# Patient Record
Sex: FEMALE | Race: WHITE | HISPANIC OR LATINO | Employment: UNEMPLOYED
[De-identification: names, ages, dates, MRNs, and addresses within clinical notes are randomized per-mention and may not be internally consistent; named-entity substitution may affect disease eponyms.]

---

## 2023-04-26 ENCOUNTER — HOSPITAL ENCOUNTER (OUTPATIENT)
Age: 43
End: 2023-04-26
Payer: MEDICAID

## 2023-04-27 ENCOUNTER — TRANSCRIBE ORDERS (OUTPATIENT)
Dept: MATERNAL FETAL MEDICINE | Facility: CLINIC | Age: 43
End: 2023-04-27

## 2023-04-27 DIAGNOSIS — O26.90 PREGNANCY RELATED CONDITION, ANTEPARTUM: Primary | ICD-10-CM

## 2023-04-28 ENCOUNTER — PRE VISIT (OUTPATIENT)
Dept: MATERNAL FETAL MEDICINE | Facility: CLINIC | Age: 43
End: 2023-04-28

## 2023-05-03 ENCOUNTER — HOSPITAL ENCOUNTER (OUTPATIENT)
Dept: ULTRASOUND IMAGING | Facility: CLINIC | Age: 43
Discharge: HOME | End: 2023-05-03
Attending: MIDWIFE

## 2023-05-03 ENCOUNTER — OFFICE VISIT (OUTPATIENT)
Dept: MATERNAL FETAL MEDICINE | Facility: CLINIC | Age: 43
End: 2023-05-03
Attending: MIDWIFE

## 2023-05-03 DIAGNOSIS — O26.90 PREGNANCY RELATED CONDITION, ANTEPARTUM: ICD-10-CM

## 2023-05-03 DIAGNOSIS — O09.523 MULTIGRAVIDA OF ADVANCED MATERNAL AGE IN THIRD TRIMESTER: Primary | ICD-10-CM

## 2023-05-03 PROCEDURE — 76811 OB US DETAILED SNGL FETUS: CPT | Mod: 26 | Performed by: OBSTETRICS & GYNECOLOGY

## 2023-05-03 PROCEDURE — 99203 OFFICE O/P NEW LOW 30 MIN: CPT | Mod: 25 | Performed by: OBSTETRICS & GYNECOLOGY

## 2023-05-03 PROCEDURE — 76811 OB US DETAILED SNGL FETUS: CPT

## 2023-05-03 NOTE — PROGRESS NOTES
[facility] Clinic Visit    Yolanda presents to [facility] clinic for ultrasound and recommendations. The following problems were addressed:    Advanced maternal age    Tests Reviewed:none  Tests Ordered: follow up ultrasound  Unique Records reviewed: na    Impression:  1) Farfan intrauterine pregnancy at 29w 1d gestational age.   2) None of the anomalies commonly detected by ultrasound were evident in the detailed fetal anatomic survey as described above.   3) Growth parameters and estimated fetal weight were consistent with established dates.  4) The amniotic fluid volume appeared normal.  5) Normal fetal activity for gestational age.  6) On transabdominal imaging the cervix appears long and closed.  7) Multiple fibroids are noted with the above measurements.    Plan:  Thank-you for referring your patient for a comprehensive ultrasound.  Yolanda states she remembers having genetic screening in [place] and states this was normal. I do into have these records.    I discussed the findings on today's ultrasound with the patient. A follow up ultrasound is scheduled at 36 weeks to reevaluate fetal growth. Weekly  testing will begin at that time.    Return to primary provider for continued prenatal care.    If you have questions regarding today's evaluation or if we can be of further service, please contact the M[facility] Medicine Center.    **Fetal anomalies may be present but not detected**    Amy Ny MD  Maternal Fetal Medicine    Total Time: 10 minutes spent on the date of the encounter doing chart review, history and exam, documentation and further activities as noted above.

## 2023-06-21 ENCOUNTER — HOSPITAL ENCOUNTER (OUTPATIENT)
Dept: ULTRASOUND IMAGING | Facility: CLINIC | Age: 43
Discharge: HOME | End: 2023-06-21
Attending: OBSTETRICS & GYNECOLOGY

## 2023-06-21 ENCOUNTER — OFFICE VISIT (OUTPATIENT)
Dept: MATERNAL FETAL MEDICINE | Facility: CLINIC | Age: 43
End: 2023-06-21
Attending: OBSTETRICS & GYNECOLOGY

## 2023-06-21 DIAGNOSIS — O09.523 MULTIGRAVIDA OF ADVANCED MATERNAL AGE IN THIRD TRIMESTER: Primary | ICD-10-CM

## 2023-06-21 DIAGNOSIS — O09.523 MULTIGRAVIDA OF ADVANCED MATERNAL AGE IN THIRD TRIMESTER: ICD-10-CM

## 2023-06-21 PROCEDURE — 76819 FETAL BIOPHYS PROFIL W/O NST: CPT | Mod: 26 | Performed by: OBSTETRICS & GYNECOLOGY

## 2023-06-21 PROCEDURE — 76816 OB US FOLLOW-UP PER FETUS: CPT | Mod: 26 | Performed by: OBSTETRICS & GYNECOLOGY

## 2023-06-21 PROCEDURE — 76816 OB US FOLLOW-UP PER FETUS: CPT

## 2023-06-21 NOTE — NURSING NOTE
Patient reports good fetal movement, denies pain, regular contractions, leaking of fluid, or bleeding.  Patient denies headache, visual changes, nausea/vomiting, epigastric pain related to preeclampsia.  Education provided to patient on Biophysical Profile US to be done weekly for AMA.  SBAR given to MFM MD, see their note in Epic.     used via IPAD during MFM appointment.

## 2023-06-21 NOTE — PATIENT INSTRUCTIONS
Patient Education   Biophysical Profile and Non-Stress Test  What are these tests for?  You may need these tests if your baby is at risk for certain problems. The tests will check your baby's health. They are often done in the third trimester.   You may have one or both tests every week until your baby is born. Some women have them twice a week.   What is a biophysical profile?  A biophysical profile is an ultrasound exam. The ultrasound will check your baby's:   Breathing movements  Body movements  Muscle tone  Amniotic fluid (the fluid around the baby).  All of this will tell us how healthy your baby is. This test takes about 30 minutes.  What is a non-stress test?  This test uses a fetal monitor to measure the baby's heart rate.   You will lie back in a chair or on a bed. We will place two belts around your abdomen. The belts are attached to a monitor.   This test will record:  the baby's heart rate  the baby's movements (the heart rate should increase when the baby moves)  if your uterus is angel (contractions, or tightenings, are common as you get closer to your due date).  A non-stress test takes about 30 minutes.   How do I get ready for these tests?  Don't smoke for at least 2 hours before these tests. In fact, you should quit smoking if you are pregnant.   When will I get my results?  You will have your results before you leave the clinic.   What if my baby doesn't pass these tests?  Most tests come out well. If your results are not reassuring, your care team will talk to you about your options. You may need to go to the hospital birthplace, where we can watch you more closely.  For informational purposes only. Not to replace the advice of your health care provider.   Copyright   2000, 2005 [employer] [facility]. Clinically reviewed by Maternal-Fetal Medicine Department.  All rights reserved. [employer] [id number] - REV 10/21.

## 2023-06-21 NOTE — PROGRESS NOTES
"Please see \"Imaging\" tab under \"Chart Review\" for details of today's US at the [facility].    Fabian Powell MD  Maternal-Fetal Medicine    "
negative...

## 2023-06-28 ENCOUNTER — HOSPITAL ENCOUNTER (OUTPATIENT)
Dept: ULTRASOUND IMAGING | Facility: CLINIC | Age: 43
Discharge: HOME | End: 2023-06-28
Attending: OBSTETRICS & GYNECOLOGY

## 2023-06-28 ENCOUNTER — OFFICE VISIT (OUTPATIENT)
Dept: MATERNAL FETAL MEDICINE | Facility: CLINIC | Age: 43
End: 2023-06-28
Attending: OBSTETRICS & GYNECOLOGY

## 2023-06-28 DIAGNOSIS — O09.523 MULTIGRAVIDA OF ADVANCED MATERNAL AGE IN THIRD TRIMESTER: Primary | ICD-10-CM

## 2023-06-28 DIAGNOSIS — O09.523 MULTIGRAVIDA OF ADVANCED MATERNAL AGE IN THIRD TRIMESTER: ICD-10-CM

## 2023-06-28 PROCEDURE — 76819 FETAL BIOPHYS PROFIL W/O NST: CPT | Mod: 26 | Performed by: OBSTETRICS & GYNECOLOGY

## 2023-06-28 PROCEDURE — 76819 FETAL BIOPHYS PROFIL W/O NST: CPT

## 2023-06-28 NOTE — NURSING NOTE
Yolanda Augusteernesto Damian is a  at 37w1d who presents to [facility] for a BPP.   on the I pad used for visit.  Pt reports positive fetal movement. Pt denies bldg/lof/change in discharge, contractions, headache, vision changes, chest pain/SOB or edema. SBAR given to Dr. Powell, see note in Epic.

## 2023-06-28 NOTE — PROGRESS NOTES
"Please see \"Imaging\" tab under \"Chart Review\" for details of today's US at the [facility].    Fabian Powell MD  Maternal-Fetal Medicine    "

## 2023-07-05 ENCOUNTER — HOSPITAL ENCOUNTER (OUTPATIENT)
Dept: ULTRASOUND IMAGING | Facility: CLINIC | Age: 43
Discharge: HOME | End: 2023-07-05
Attending: OBSTETRICS & GYNECOLOGY
Payer: MEDICAID

## 2023-07-05 ENCOUNTER — OFFICE VISIT (OUTPATIENT)
Dept: MATERNAL FETAL MEDICINE | Facility: CLINIC | Age: 43
End: 2023-07-05
Attending: OBSTETRICS & GYNECOLOGY
Payer: MEDICAID

## 2023-07-05 DIAGNOSIS — O09.523 MULTIGRAVIDA OF ADVANCED MATERNAL AGE IN THIRD TRIMESTER: Primary | ICD-10-CM

## 2023-07-05 DIAGNOSIS — O09.523 MULTIGRAVIDA OF ADVANCED MATERNAL AGE IN THIRD TRIMESTER: ICD-10-CM

## 2023-07-05 PROCEDURE — 76819 FETAL BIOPHYS PROFIL W/O NST: CPT

## 2023-07-05 PROCEDURE — 76819 FETAL BIOPHYS PROFIL W/O NST: CPT | Mod: 26 | Performed by: OBSTETRICS & GYNECOLOGY

## 2023-07-05 NOTE — PROGRESS NOTES
The patient was seen for an ultrasound in the [facility] Medicine Center at the [facility] today.  For a detailed report of the ultrasound examination, please see the ultrasound report which can be found under the imaging tab.    If you have questions regarding today's evaluation or if we can be of further service, please contact the M[facility]etal Medicine Center.    Joselin Zarate MD  , OB/GYN  Maternal-Fetal Medicine  [phone] (Pager)

## 2023-07-05 NOTE — NURSING NOTE
Patient reports active fetal movement, denies pain, regular contractions, leaking of fluid, or bleeding.  Patient denies headache, visual changes, nausea/vomiting, epigastric pain related to preeclampsia.  SBAR given to MFM MD, see their note in Epic.       used via IPAD during MFM appointment.

## 2023-07-11 ENCOUNTER — HOSPITAL ENCOUNTER (INPATIENT)
Facility: CLINIC | Age: 43
LOS: 2 days | Discharge: HOME | End: 2023-07-13
Attending: FAMILY MEDICINE | Admitting: ADVANCED PRACTICE MIDWIFE
Payer: MEDICAID

## 2023-07-11 ENCOUNTER — ANESTHESIA EVENT (OUTPATIENT)
Dept: OBGYN | Facility: CLINIC | Age: 43
End: 2023-07-11
Payer: MEDICAID

## 2023-07-11 ENCOUNTER — ANESTHESIA (OUTPATIENT)
Dept: OBGYN | Facility: CLINIC | Age: 43
End: 2023-07-11
Payer: MEDICAID

## 2023-07-11 PROBLEM — Z78.9 NOT IMMUNE TO HEPATITIS B VIRUS: Status: ACTIVE | Noted: 2023-07-11

## 2023-07-11 PROBLEM — O09.299 PRIOR PREGNANCY WITH FETAL DEMISE: Status: ACTIVE | Noted: 2023-07-11

## 2023-07-11 PROBLEM — O09.90 SUPERVISION OF HIGH RISK PREGNANCY, ANTEPARTUM: Status: ACTIVE | Noted: 2023-04-28

## 2023-07-11 PROBLEM — O09.523 MULTIGRAVIDA OF ADVANCED MATERNAL AGE IN THIRD TRIMESTER: Status: ACTIVE | Noted: 2023-05-30

## 2023-07-11 PROBLEM — O09.529 AMA (ADVANCED MATERNAL AGE) MULTIGRAVIDA 35+: Status: ACTIVE | Noted: 2023-07-11

## 2023-07-11 PROBLEM — Z34.90 ENCOUNTER FOR INDUCTION OF LABOR: Status: ACTIVE | Noted: 2023-07-11

## 2023-07-11 PROBLEM — O09.529 SUPERVISION OF HIGH-RISK PREGNANCY OF ELDERLY MULTIGRAVIDA: Status: ACTIVE | Noted: 2023-07-11

## 2023-07-11 LAB
ABO/RH(D): NORMAL
ANTIBODY SCREEN: NEGATIVE
HGB BLD-MCNC: 12.7 G/DL (ref 11.7–15.7)
SPECIMEN EXPIRATION DATE: NORMAL
T PALLIDUM AB SER QL: NONREACTIVE

## 2023-07-11 PROCEDURE — 86901 BLOOD TYPING SEROLOGIC RH(D): CPT

## 2023-07-11 PROCEDURE — 258N000003 HC RX IP 258 OP 636

## 2023-07-11 PROCEDURE — 86780 TREPONEMA PALLIDUM: CPT

## 2023-07-11 PROCEDURE — 3E0P7GC INTRODUCTION OF OTHER THERAPEUTIC SUBSTANCE INTO FEMALE REPRODUCTIVE, VIA NATURAL OR ARTIFICIAL OPENING: ICD-10-PCS | Performed by: FAMILY MEDICINE

## 2023-07-11 PROCEDURE — 85018 HEMOGLOBIN: CPT

## 2023-07-11 PROCEDURE — 00HU33Z INSERTION OF INFUSION DEVICE INTO SPINAL CANAL, PERCUTANEOUS APPROACH: ICD-10-PCS | Performed by: ANESTHESIOLOGY

## 2023-07-11 PROCEDURE — 250N000011 HC RX IP 250 OP 636

## 2023-07-11 PROCEDURE — 370N000003 HC ANESTHESIA WARD SERVICE: Performed by: ANESTHESIOLOGY

## 2023-07-11 PROCEDURE — 3E0R3BZ INTRODUCTION OF ANESTHETIC AGENT INTO SPINAL CANAL, PERCUTANEOUS APPROACH: ICD-10-PCS | Performed by: ANESTHESIOLOGY

## 2023-07-11 PROCEDURE — 99223 1ST HOSP IP/OBS HIGH 75: CPT | Mod: AI | Performed by: FAMILY MEDICINE

## 2023-07-11 PROCEDURE — 120N000002 HC R&B MED SURG/OB UMMC

## 2023-07-11 PROCEDURE — 250N000013 HC RX MED GY IP 250 OP 250 PS 637

## 2023-07-11 PROCEDURE — 250N000011 HC RX IP 250 OP 636: Performed by: ANESTHESIOLOGY

## 2023-07-11 PROCEDURE — 86850 RBC ANTIBODY SCREEN: CPT

## 2023-07-11 RX ORDER — CARBOPROST TROMETHAMINE 250 UG/ML
250 INJECTION, SOLUTION INTRAMUSCULAR
Status: DISCONTINUED | OUTPATIENT
Start: 2023-07-11 | End: 2023-07-12 | Stop reason: HOSPADM

## 2023-07-11 RX ORDER — MISOPROSTOL 200 UG/1
800 TABLET ORAL
Status: DISCONTINUED | OUTPATIENT
Start: 2023-07-11 | End: 2023-07-12 | Stop reason: HOSPADM

## 2023-07-11 RX ORDER — KETOROLAC TROMETHAMINE 30 MG/ML
30 INJECTION, SOLUTION INTRAMUSCULAR; INTRAVENOUS
Status: DISCONTINUED | OUTPATIENT
Start: 2023-07-11 | End: 2023-07-13 | Stop reason: HOSPADM

## 2023-07-11 RX ORDER — LIDOCAINE HYDROCHLORIDE AND EPINEPHRINE 15; 5 MG/ML; UG/ML
3 INJECTION, SOLUTION EPIDURAL
Status: DISCONTINUED | OUTPATIENT
Start: 2023-07-11 | End: 2023-07-12 | Stop reason: HOSPADM

## 2023-07-11 RX ORDER — OXYTOCIN/0.9 % SODIUM CHLORIDE 30/500 ML
100-340 PLASTIC BAG, INJECTION (ML) INTRAVENOUS CONTINUOUS PRN
Status: DISCONTINUED | OUTPATIENT
Start: 2023-07-11 | End: 2023-07-13 | Stop reason: HOSPADM

## 2023-07-11 RX ORDER — OXYTOCIN 10 [USP'U]/ML
10 INJECTION, SOLUTION INTRAMUSCULAR; INTRAVENOUS
Status: DISCONTINUED | OUTPATIENT
Start: 2023-07-11 | End: 2023-07-13 | Stop reason: HOSPADM

## 2023-07-11 RX ORDER — LIDOCAINE HYDROCHLORIDE 10 MG/ML
INJECTION, SOLUTION EPIDURAL; INFILTRATION; INTRACAUDAL; PERINEURAL
Status: DISCONTINUED
Start: 2023-07-11 | End: 2023-07-12 | Stop reason: WASHOUT

## 2023-07-11 RX ORDER — HYDROXYZINE HYDROCHLORIDE 50 MG/1
50 TABLET, FILM COATED ORAL
Status: DISCONTINUED | OUTPATIENT
Start: 2023-07-11 | End: 2023-07-12 | Stop reason: HOSPADM

## 2023-07-11 RX ORDER — MISOPROSTOL 200 UG/1
400 TABLET ORAL
Status: DISCONTINUED | OUTPATIENT
Start: 2023-07-11 | End: 2023-07-12 | Stop reason: HOSPADM

## 2023-07-11 RX ORDER — METOCLOPRAMIDE 10 MG/1
10 TABLET ORAL EVERY 6 HOURS PRN
Status: DISCONTINUED | OUTPATIENT
Start: 2023-07-11 | End: 2023-07-12 | Stop reason: HOSPADM

## 2023-07-11 RX ORDER — ONDANSETRON 4 MG/1
4 TABLET, ORALLY DISINTEGRATING ORAL EVERY 6 HOURS PRN
Status: DISCONTINUED | OUTPATIENT
Start: 2023-07-11 | End: 2023-07-12 | Stop reason: HOSPADM

## 2023-07-11 RX ORDER — NALOXONE HYDROCHLORIDE 0.4 MG/ML
0.2 INJECTION, SOLUTION INTRAMUSCULAR; INTRAVENOUS; SUBCUTANEOUS
Status: DISCONTINUED | OUTPATIENT
Start: 2023-07-11 | End: 2023-07-12 | Stop reason: HOSPADM

## 2023-07-11 RX ORDER — FENTANYL CITRATE 50 UG/ML
100 INJECTION, SOLUTION INTRAMUSCULAR; INTRAVENOUS
Status: DISCONTINUED | OUTPATIENT
Start: 2023-07-11 | End: 2023-07-12 | Stop reason: HOSPADM

## 2023-07-11 RX ORDER — OXYTOCIN 10 [USP'U]/ML
10 INJECTION, SOLUTION INTRAMUSCULAR; INTRAVENOUS
Status: DISCONTINUED | OUTPATIENT
Start: 2023-07-11 | End: 2023-07-12 | Stop reason: HOSPADM

## 2023-07-11 RX ORDER — PROCHLORPERAZINE MALEATE 10 MG
10 TABLET ORAL EVERY 6 HOURS PRN
Status: DISCONTINUED | OUTPATIENT
Start: 2023-07-11 | End: 2023-07-12 | Stop reason: HOSPADM

## 2023-07-11 RX ORDER — ONDANSETRON 2 MG/ML
4 INJECTION INTRAMUSCULAR; INTRAVENOUS EVERY 6 HOURS PRN
Status: DISCONTINUED | OUTPATIENT
Start: 2023-07-11 | End: 2023-07-12 | Stop reason: HOSPADM

## 2023-07-11 RX ORDER — PROCHLORPERAZINE 25 MG
25 SUPPOSITORY, RECTAL RECTAL EVERY 12 HOURS PRN
Status: DISCONTINUED | OUTPATIENT
Start: 2023-07-11 | End: 2023-07-12 | Stop reason: HOSPADM

## 2023-07-11 RX ORDER — SODIUM CHLORIDE, SODIUM LACTATE, POTASSIUM CHLORIDE, CALCIUM CHLORIDE 600; 310; 30; 20 MG/100ML; MG/100ML; MG/100ML; MG/100ML
INJECTION, SOLUTION INTRAVENOUS
Status: DISCONTINUED
Start: 2023-07-11 | End: 2023-07-12 | Stop reason: HOSPADM

## 2023-07-11 RX ORDER — MISOPROSTOL 100 UG/1
25 TABLET ORAL EVERY 4 HOURS PRN
Status: DISCONTINUED | OUTPATIENT
Start: 2023-07-11 | End: 2023-07-12 | Stop reason: HOSPADM

## 2023-07-11 RX ORDER — MISOPROSTOL 200 UG/1
TABLET ORAL
Status: DISCONTINUED
Start: 2023-07-11 | End: 2023-07-12 | Stop reason: WASHOUT

## 2023-07-11 RX ORDER — ACETAMINOPHEN 325 MG/1
650 TABLET ORAL EVERY 4 HOURS PRN
Status: DISCONTINUED | OUTPATIENT
Start: 2023-07-11 | End: 2023-07-12 | Stop reason: HOSPADM

## 2023-07-11 RX ORDER — OXYTOCIN 10 [USP'U]/ML
INJECTION, SOLUTION INTRAMUSCULAR; INTRAVENOUS
Status: DISCONTINUED
Start: 2023-07-11 | End: 2023-07-12 | Stop reason: WASHOUT

## 2023-07-11 RX ORDER — TRANEXAMIC ACID 10 MG/ML
1 INJECTION, SOLUTION INTRAVENOUS EVERY 30 MIN PRN
Status: DISCONTINUED | OUTPATIENT
Start: 2023-07-11 | End: 2023-07-12 | Stop reason: HOSPADM

## 2023-07-11 RX ORDER — NALBUPHINE HYDROCHLORIDE 10 MG/ML
2.5-5 INJECTION, SOLUTION INTRAMUSCULAR; INTRAVENOUS; SUBCUTANEOUS EVERY 6 HOURS PRN
Status: DISCONTINUED | OUTPATIENT
Start: 2023-07-11 | End: 2023-07-13 | Stop reason: HOSPADM

## 2023-07-11 RX ORDER — OXYTOCIN/0.9 % SODIUM CHLORIDE 30/500 ML
PLASTIC BAG, INJECTION (ML) INTRAVENOUS
Status: DISCONTINUED
Start: 2023-07-11 | End: 2023-07-12 | Stop reason: HOSPADM

## 2023-07-11 RX ORDER — METOCLOPRAMIDE HYDROCHLORIDE 5 MG/ML
10 INJECTION INTRAMUSCULAR; INTRAVENOUS EVERY 6 HOURS PRN
Status: DISCONTINUED | OUTPATIENT
Start: 2023-07-11 | End: 2023-07-12 | Stop reason: HOSPADM

## 2023-07-11 RX ORDER — LIDOCAINE 40 MG/G
CREAM TOPICAL
Status: DISCONTINUED | OUTPATIENT
Start: 2023-07-11 | End: 2023-07-12 | Stop reason: HOSPADM

## 2023-07-11 RX ORDER — CITRIC ACID/SODIUM CITRATE 334-500MG
30 SOLUTION, ORAL ORAL
Status: DISCONTINUED | OUTPATIENT
Start: 2023-07-11 | End: 2023-07-12 | Stop reason: HOSPADM

## 2023-07-11 RX ORDER — OXYTOCIN/0.9 % SODIUM CHLORIDE 30/500 ML
340 PLASTIC BAG, INJECTION (ML) INTRAVENOUS CONTINUOUS PRN
Status: DISCONTINUED | OUTPATIENT
Start: 2023-07-11 | End: 2023-07-12 | Stop reason: HOSPADM

## 2023-07-11 RX ORDER — FENTANYL CITRATE-0.9 % NACL/PF 10 MCG/ML
100 PLASTIC BAG, INJECTION (ML) INTRAVENOUS EVERY 5 MIN PRN
Status: DISCONTINUED | OUTPATIENT
Start: 2023-07-11 | End: 2023-07-12 | Stop reason: HOSPADM

## 2023-07-11 RX ORDER — METHYLERGONOVINE MALEATE 0.2 MG/ML
200 INJECTION INTRAVENOUS
Status: DISCONTINUED | OUTPATIENT
Start: 2023-07-11 | End: 2023-07-12 | Stop reason: HOSPADM

## 2023-07-11 RX ORDER — IBUPROFEN 800 MG/1
800 TABLET, FILM COATED ORAL
Status: DISCONTINUED | OUTPATIENT
Start: 2023-07-11 | End: 2023-07-13 | Stop reason: HOSPADM

## 2023-07-11 RX ORDER — FENTANYL/ROPIVACAINE/NS/PF 2MCG/ML-.1
PLASTIC BAG, INJECTION (ML) EPIDURAL
Status: DISCONTINUED | OUTPATIENT
Start: 2023-07-11 | End: 2023-07-12 | Stop reason: HOSPADM

## 2023-07-11 RX ORDER — OXYCODONE HYDROCHLORIDE 5 MG/1
5 TABLET ORAL
Status: DISCONTINUED | OUTPATIENT
Start: 2023-07-11 | End: 2023-07-13 | Stop reason: HOSPADM

## 2023-07-11 RX ORDER — NALOXONE HYDROCHLORIDE 0.4 MG/ML
0.4 INJECTION, SOLUTION INTRAMUSCULAR; INTRAVENOUS; SUBCUTANEOUS
Status: DISCONTINUED | OUTPATIENT
Start: 2023-07-11 | End: 2023-07-12 | Stop reason: HOSPADM

## 2023-07-11 RX ADMIN — MISOPROSTOL 25 MCG: 100 TABLET ORAL at 11:22

## 2023-07-11 RX ADMIN — MISOPROSTOL 25 MCG: 100 TABLET ORAL at 18:35

## 2023-07-11 RX ADMIN — Medication: at 23:12

## 2023-07-11 RX ADMIN — SODIUM CHLORIDE, POTASSIUM CHLORIDE, SODIUM LACTATE AND CALCIUM CHLORIDE 1000 ML: 600; 310; 30; 20 INJECTION, SOLUTION INTRAVENOUS at 14:00

## 2023-07-11 RX ADMIN — Medication 8 ML: at 22:56

## 2023-07-11 ASSESSMENT — ACTIVITIES OF DAILY LIVING (ADL)
ADLS_ACUITY_SCORE: 31
ADLS_ACUITY_SCORE: 35

## 2023-07-11 NOTE — PLAN OF CARE
"Yolanda was admitted for an IOL for AMA. She has a history of 5 losses (2 ectopics) and is now at 39weeks GA. She presented in no acute distress and is strictly SSO, her  can communicated simple directions and needs. Pt and  encourage to request interpretor at any time for clear communication, iPad left in the room and was used by the provider for initial assessment, teaching and planning with the patient and her . RN had used interpretor iPad for all assessments, teaching and discussions regarding the plan of care. Yolanda and her  appear happy and deny any questions or concerns. Induction with Miso initiated at 1120. Pt assisted with ordering lunch and  with quest tray. Pt noted not be drinking much PO fluids, instructed to increase PO intake and she drank 800cc right away. IV bolus infusing.       Problem: Plan of Care - These are the overarching goals to be used throughout the patient stay.    Goal: Plan of Care Review  Description: The Plan of Care Review/Shift note should be completed every shift.  The Outcome Evaluation is a brief statement about your assessment that the patient is improving, declining, or no change.  This information will be displayed automatically on your shift note.  Flowsheets (Taken 7/11/2023 1422)  Plan of Care Reviewed With:   patient   spouse  Overall Patient Progress: improving  Goal: Patient-Specific Goal (Individualized)  Description: You can add care plan individualizations to a care plan. Examples of Individualization might be:  \"Parent requests to be called daily at 9am for status\", \"I have a hard time hearing out of my right ear\", or \"Do not touch me to wake me up as it startles me\".  Flowsheets (Taken 7/11/2023 1422)  Individualized Care Needs:  required for communication, and medical discussions / teaching  Goal: Optimal Comfort and Wellbeing  Intervention: Provide Person-Centered Care  Recent Flowsheet Documentation  Taken " 7/11/2023 0958 by Leyla Tapia RN  Trust Relationship/Rapport:   care explained   questions answered   reassurance provided     Problem: Labor  Goal: Stable Fetal Wellbeing  Outcome: Progressing  Goal: Acceptable Pain Control  Outcome: Progressing  Goal: Normal Uterine Contraction Pattern  Outcome: Progressing     Problem: Pain Acute  Goal: Optimal Pain Control and Function  Outcome: Progressing     Problem: Maternal-Fetal Wellbeing  Goal: Optimal Maternal-Fetal Wellbeing  Outcome: Progressing   Goal Outcome Evaluation:      Plan of Care Reviewed With: patient, spouse    Overall Patient Progress: improvingOverall Patient Progress: improving

## 2023-07-11 NOTE — H&P
[facility]  OB Admit Note    Yolanda Damian MRN# [id number]   Age: 43 year old YOB: 1980     Date of Admission: 2023 10:43 AM  Date of service: 2023       History of Present Illness (Resident / Clinician):     Yolanda Damian is a patient from [facility]. Patient is a 43 year old  who is 39w0d pregnant with JAVIER  2023, by 8w US not c/w LMP    The patient presents to the BirthPlace for induction of labor, indication advanced maternal age, history of recurrent fetal demise.    Reports regular non-painful occurring every 20 minutes. denies fluid leakage. denies bleeding per vagina. Fetal movement is .normal.    The prenatal course has been complicated by   - AMA  - History of recurrent fetal demise  - Hepatitis B non-immune      Prenatal labs   Lab Results   Component Value Date    HGB 12.7 2023       GBS was collected on 23 and was negative    Weight gain during pregnancy: 12 lbs   2023: 74.1 kg (163 lb 6 oz)   2023: 79.4 kg (175 lb)    EFW at 36w was 2718g 6 lbs 37%ile         Obstetrical History:   She is a 43 year old   Her OB history:   OB History    Para Term  AB Living   6 0 0 0 5 0   SAB IAB Ectopic Multiple Live Births   3 0 2 0 0      # Outcome Date GA Lbr Mika/2nd Weight Sex Delivery Anes PTL Lv   6 Current            5 Ectopic 11/10/16     ECTOPIC         Birth Comments: treated with MTX, L side   4 SAB 08/06/15              Birth Comments: System Generated. Please review and update pregnancy details.   3 Ectopic 03/15/15           2 SAB 14           1 SAB 13                      Immunzations:       There is no immunization history on file for this patient.  Tdap this pregnancy?:YES - Date: 2023  Flu shot this pregnancy? No  COVID immunized?: Yes         Past Medical History:   History reviewed. No pertinent past medical history.         Past Surgical History:   History  "reviewed. No pertinent surgical history.         Family History:   History reviewed. No pertinent family history.  No family history of congenital anomalies         Social History:   no tobacco use  no alcohol use  no illicit drug use         Medications:   No current facility-administered medications on file prior to encounter.  No current outpatient medications on file prior to encounter.           Allergies:   Patient has no known allergies.         Review of Systems:   CONSTITUTIONAL: no fatigue, no unexpected change in weight  SKIN: no worrisome rashes or lesions  EYES: no acute vision problems or changes  ENT: no ear problems, no mouth problems, no throat problems  RESP: no significant cough, no shortness of breath  CV: no chest pain, no palpitations, no new or worsening peripheral edema  GI: no nausea, no vomiting, no constipation, no diarrhea  : no frequency, no dysuria, no hematuria  NEURO: no weakness, no dizziness, no headaches  ENDOCRINE: no temperature intolerance, no skin/hair changes  PSYCHIATRIC: NEGATIVE for changes in mood or trouble with sleep         Physical Exam:   Vitals:   Temp 98.5  F (36.9  C) (Oral)   Resp 18   Ht 1.575 m (5' 2\")   Wt 79.4 kg (175 lb)   LMP 10/19/2022   BMI 32.01 kg/m    175 lbs 0 oz  Estimated body mass index is 32.01 kg/m  as calculated from the following:    Height as of this encounter: 1.575 m (5' 2\").    Weight as of this encounter: 79.4 kg (175 lb).    GEN: Awake, alert in no apparent distress   HEENT: grossly normal  RESPIRATORY: no increased work of breathing  CARDIOVASCULAR: warm well perfused  ABDOMEN: gravid  PELVIC:  no fluid noted, no blood noted.    Cervix: 0/20%/-4, posterior, medium consistency by MD  Confirmed VTX by Ultrasound? YES     Electronic Fetal Monitoring:  O: Baseline rate normal  Variability moderate  Accelerations present  Decelerations not present      Assessment: Reactive NST    Strip reviewed at bedside    NST interpretation:  Ordered " by  Dr. Enedelia Serrano  Start time: 9:50am   Stop time 10:50am  Baseline rate 150 normal  Accelerations present  Decelerations not present  Interpretation: reactive      Assessment and Plan:   Assessment:   Yolanda Damian is a 43 year old  at 39w0d in prodromal labor here for IOL  AMA and history of recurrent fetal demise. Admission marmolejo is 1. Feeling contractions q20 min as mild tightening without pain, angel q5-6 min on the monitor. Membranes are intact. GBS status is negative. Blood type is O positive.     No family history of PPH. No personal history of asthma, hypertension, or bleeding disorder. No family history of congenital anomalies.     Pregnancy complicated by   - AMA  - History of recurrent fetal demise  - Hepatitis B non-immune    Patient Active Problem List   Diagnosis     Encounter for induction of labor     AMA (advanced maternal age) multigravida 35+     Prior pregnancy with fetal demise     Supervision of high-risk pregnancy of elderly multigravida     Not immune to hepatitis B virus         Plan:     # Induction of Labor   Cervix: 0/20%/-4 medium posterior, Marmolejo score: 1.  - Admit for IOL in the setting of AMA and history of recurrent pregnancy loss  - Will plan to start with vaginal misoprostol given above Marmolejo's score  - Open to balloon catheter   - Membranes: intact  - Augmentation: Pitocin, AROM prn  - Labs: Hgb, T&S, RPR ordered and pending  - GBS negative 23; Antibiotics not indicated.   - Pain Control: Per patient request; Discussed options. Considering epidural in active labor  - Lacks contraindications to routine induction medications  - Diet: Regular until epidural, then CLD  - PPH Meds: Standard Meds. Low risk. Lacks contraindications to routine PPH meds.  - PPx: SCDs for prolonged periods of immobility    # Hepatitis B non-immune  - Recommend Hep B vaccine for mom and baby in immediate post-partum period    # AMA  - IOL    # PNC  - Rh pos, Rubella  immune, GCT passed 1h, GBS negative  - Other prenatal labs wnl  - Imaging: Placenta posterior, no previa > 2 cm from internal os   - Flu vaccine not indicated, Tdap vaccine given 2023  - Pap last 23 normal  - Contraception: not discussed  - Feeding: not discussed                    # FWB:   Cat I tracing, vertex by BSUS; EFW by US at 36w was 2718g 6 lbs 37%ile  - Continuous fetal monitoring  - NICU for delivery not indicated unless intrapartum concerns arise  - Intrauterine resuscitative measures prn     # PPH Risk:  Medium (IOL, augment with pit, HTN, mag, triple III, prolonged labor, precipitous labor/delivery, h/o abruption, >5 deliveries, fibroids, large baby, poly, multiples, general anesthesia, shoulder dystocia, lacs/epis, op delivery, hematoma)- IV, type and screen      # Disposition and coordination:  - Admit - see IP orders  cervical ripening with misoprostol  Pain medication: fentanyl and nitrous oxide available per protocol, considering epidural in active labor  Theraputic sleep w/ hydroxyzine ordered PRN  Anticipate            MD Candice Salinas's   PGY2    Physician Attestation   I saw this patient with the resident and agree with the resident/fellow's findings and plan of care as documented in the note.  75 minutes spent on the date of the encounter doing chart review, history and exam, documentation and further activities per the note     Enedelia Serrano MD  Date of Service (when I saw the patient): 23

## 2023-07-11 NOTE — PROGRESS NOTES
"S;General appearance: comfortable  Support: spouse David here at her side       Temperature 98.5  F (36.9  C), temperature source Oral, resp. rate 18, height 1.575 m (5' 2\"), weight 79.4 kg (175 lb), last menstrual period 10/19/2022.      Baseline rate 150, normal  Variability moderate  Accelerations present   Decelerations not presentCONTRACTIONS:   Contractions every 2-5 minutes.  Palpate: mild  Pitocin- none,  Antibiotics- PCN  miso PV # 1 at 1130 per Dr Serrano     ROM: not ruptured  PELVIC EXAM:deferred  # Pain Assessment:   Yolanda mo pain level was assessed and she currently denies pain.        Assessment: EFM interpretation suggests absence of concern for fetal metabolic acidemia at this time  Labor course:    ASSESSMENT:  ==============  Yolanda MILLIE Damian  43 year old  female  Estimated Date of Delivery: 2023  42 yo  IUP @ 39w0d IOL for AMA, hx of recurrent fetal loss    Fetal Heart rate tracing  category one   GBS- negative    Patient Active Problem List   Diagnosis     Encounter for induction of labor     AMA (advanced maternal age) multigravida 35+     Prior pregnancy with fetal demise     Supervision of high-risk pregnancy of elderly multigravida     Not immune to hepatitis B virus          PLAN:  ===========  comfort measures prn   cervical ripening with misoprostol  Anticipate   MD consultant on call / available prn  reevaluate in 2-4 hours/PRN   JOAN BarrowM    "

## 2023-07-11 NOTE — PROGRESS NOTES
"S;General appearance: comfortable   via IPAD  Discussion of cervical ripening process and tools miso, medina balloon    pt is noting contractions getting stronger since 1530  Discussed deferred vaginal miso dose at 1530  due to contraction frequency  Denies bloody show or SROM   Support: spouse is at her side David   Pt consents to exam to evaluate for medina balloon mechanical ripening as unable to place miso at this time     Temperature 98.5  F (36.9  C), temperature source Oral, resp. rate 18, height 1.575 m (5' 2\"), weight 79.4 kg (175 lb), last menstrual period 10/19/2022.      Baseline rate 145, normal  Variability moderate  Periods of minimal variability no decels   Accelerations present   Decelerations not present    CONTRACTIONS: Contractions every 2-3 minutes with occ contraction 4 min apart   Palpate: mild  Pitocin- none,  Antibiotics- none  ROM: not ruptured  PELVIC EXAM: closed/ 50-60%/ Mid/ average/ -2   # Pain Assessment:   Yolanda mo pain level was assessed and she currently denies pain.        Assessment: EFM interpretation suggests absence of concern for fetal metabolic acidemia at this time due to accelerations present, heart rate: normal baseline and variability: moderate    ASSESSMENT:  ==============  Yolanda PINTO Katie Damian  43 year old  female  Estimated Date of Delivery: 2023  IUP @ 39w0d IOL AMA    Fetal Heart rate tracing  category one  GBS- negative    Patient Active Problem List   Diagnosis     Encounter for induction of labor     AMA (advanced maternal age) multigravida 35+     Prior pregnancy with fetal demise     Supervision of high-risk pregnancy of elderly multigravida     Not immune to hepatitis B virus          PLAN:  ===========  comfort measures prn   Discussed time off EFM at this time spt will get up and walk use ball move around room  To contact provider if noting contractions spacing further  Plan to reevaluate for next miso  with EFM in an " hour or prn    Discussed eating drinking moving   Consider reevaluation for medina balloon this evening   Couple is agreeable to plan   Anticipate   MD consultant on call / available prn  reevaluate in 2-4 hours/PRN     JOAN Barrow CNM

## 2023-07-12 ENCOUNTER — APPOINTMENT (OUTPATIENT)
Dept: INTERPRETER SERVICES | Facility: CLINIC | Age: 43
End: 2023-07-12
Payer: MEDICAID

## 2023-07-12 PROCEDURE — 59409 OBSTETRICAL CARE: CPT | Performed by: ADVANCED PRACTICE MIDWIFE

## 2023-07-12 PROCEDURE — 250N000009 HC RX 250

## 2023-07-12 PROCEDURE — 120N000002 HC R&B MED SURG/OB UMMC

## 2023-07-12 PROCEDURE — 722N000001 HC LABOR CARE VAGINAL DELIVERY SINGLE

## 2023-07-12 PROCEDURE — 250N000013 HC RX MED GY IP 250 OP 250 PS 637: Performed by: ADVANCED PRACTICE MIDWIFE

## 2023-07-12 RX ORDER — ACETAMINOPHEN 325 MG/1
650 TABLET ORAL EVERY 4 HOURS PRN
Status: DISCONTINUED | OUTPATIENT
Start: 2023-07-12 | End: 2023-07-13 | Stop reason: HOSPADM

## 2023-07-12 RX ORDER — OXYTOCIN 10 [USP'U]/ML
10 INJECTION, SOLUTION INTRAMUSCULAR; INTRAVENOUS
Status: DISCONTINUED | OUTPATIENT
Start: 2023-07-12 | End: 2023-07-13 | Stop reason: HOSPADM

## 2023-07-12 RX ORDER — NALOXONE HYDROCHLORIDE 0.4 MG/ML
0.2 INJECTION, SOLUTION INTRAMUSCULAR; INTRAVENOUS; SUBCUTANEOUS
Status: DISCONTINUED | OUTPATIENT
Start: 2023-07-12 | End: 2023-07-13 | Stop reason: HOSPADM

## 2023-07-12 RX ORDER — MISOPROSTOL 200 UG/1
800 TABLET ORAL
Status: DISCONTINUED | OUTPATIENT
Start: 2023-07-12 | End: 2023-07-13 | Stop reason: HOSPADM

## 2023-07-12 RX ORDER — NALOXONE HYDROCHLORIDE 0.4 MG/ML
0.4 INJECTION, SOLUTION INTRAMUSCULAR; INTRAVENOUS; SUBCUTANEOUS
Status: DISCONTINUED | OUTPATIENT
Start: 2023-07-12 | End: 2023-07-13 | Stop reason: HOSPADM

## 2023-07-12 RX ORDER — MODIFIED LANOLIN
OINTMENT (GRAM) TOPICAL
Status: DISCONTINUED | OUTPATIENT
Start: 2023-07-12 | End: 2023-07-13 | Stop reason: HOSPADM

## 2023-07-12 RX ORDER — OXYTOCIN/0.9 % SODIUM CHLORIDE 30/500 ML
340 PLASTIC BAG, INJECTION (ML) INTRAVENOUS CONTINUOUS PRN
Status: DISCONTINUED | OUTPATIENT
Start: 2023-07-12 | End: 2023-07-13 | Stop reason: HOSPADM

## 2023-07-12 RX ORDER — MISOPROSTOL 200 UG/1
400 TABLET ORAL
Status: DISCONTINUED | OUTPATIENT
Start: 2023-07-12 | End: 2023-07-13 | Stop reason: HOSPADM

## 2023-07-12 RX ORDER — DOCUSATE SODIUM 100 MG/1
100 CAPSULE, LIQUID FILLED ORAL DAILY
Status: DISCONTINUED | OUTPATIENT
Start: 2023-07-12 | End: 2023-07-13 | Stop reason: HOSPADM

## 2023-07-12 RX ORDER — BISACODYL 10 MG
10 SUPPOSITORY, RECTAL RECTAL DAILY PRN
Status: DISCONTINUED | OUTPATIENT
Start: 2023-07-12 | End: 2023-07-13 | Stop reason: HOSPADM

## 2023-07-12 RX ORDER — IBUPROFEN 800 MG/1
800 TABLET, FILM COATED ORAL EVERY 6 HOURS PRN
Status: DISCONTINUED | OUTPATIENT
Start: 2023-07-12 | End: 2023-07-13 | Stop reason: HOSPADM

## 2023-07-12 RX ORDER — METHYLERGONOVINE MALEATE 0.2 MG/ML
200 INJECTION INTRAVENOUS
Status: DISCONTINUED | OUTPATIENT
Start: 2023-07-12 | End: 2023-07-13 | Stop reason: HOSPADM

## 2023-07-12 RX ORDER — HYDROCORTISONE 25 MG/G
CREAM TOPICAL 3 TIMES DAILY PRN
Status: DISCONTINUED | OUTPATIENT
Start: 2023-07-12 | End: 2023-07-13 | Stop reason: HOSPADM

## 2023-07-12 RX ORDER — CARBOPROST TROMETHAMINE 250 UG/ML
250 INJECTION, SOLUTION INTRAMUSCULAR
Status: DISCONTINUED | OUTPATIENT
Start: 2023-07-12 | End: 2023-07-13 | Stop reason: HOSPADM

## 2023-07-12 RX ORDER — TRANEXAMIC ACID 10 MG/ML
1 INJECTION, SOLUTION INTRAVENOUS EVERY 30 MIN PRN
Status: DISCONTINUED | OUTPATIENT
Start: 2023-07-12 | End: 2023-07-13 | Stop reason: HOSPADM

## 2023-07-12 RX ADMIN — Medication 340 ML/HR: at 03:57

## 2023-07-12 RX ADMIN — DOCUSATE SODIUM 100 MG: 100 CAPSULE, LIQUID FILLED ORAL at 09:27

## 2023-07-12 RX ADMIN — IBUPROFEN 800 MG: 800 TABLET ORAL at 09:27

## 2023-07-12 RX ADMIN — IBUPROFEN 800 MG: 800 TABLET ORAL at 17:53

## 2023-07-12 RX ADMIN — ACETAMINOPHEN 650 MG: 325 TABLET, FILM COATED ORAL at 11:46

## 2023-07-12 ASSESSMENT — ACTIVITIES OF DAILY LIVING (ADL)
ADLS_ACUITY_SCORE: 31

## 2023-07-12 NOTE — PLAN OF CARE
Data: Yolanda Damian transferred to 70 via wheelchair at 0820. Baby transferred via parent's arms.  Action: Receiving unit notified of transfer: Yes. Patient and family notified of room change. Report given to Priyanka MCCLAIN at 0755 with updates at the bedside. Belongings sent to receiving unit. Accompanied by Registered Nurse. Oriented patient to surroundings. Call light within reach. ID bands double-checked with receiving RN.  Response: Patient tolerated transfer and is stable.

## 2023-07-12 NOTE — PROGRESS NOTES
"Labor progress note    S:  Patient comfortable at this time with labor epidural.  Reports feeling increased pelvic pressure since epidural placement.  Consents to cervical exam at this time.    O:  Blood pressure 122/58, temperature 97.5  F (36.4  C), temperature source Oral, resp. rate 18, height 1.575 m (5' 2\"), weight 79.4 kg (175 lb), last menstrual period 10/19/2022, SpO2 98 %.  General appearance: comfortable.  Contractions: Every 1-3 minutes.  seconds duration.  Palpate: strong.  FHT: Baseline 145 with minimal variability. Accelerations are not present. Late decelerations present, that resolved with position change and active pushing efforts.  Variability also improved with active pushing efforts.  ROM: clear fluid. Membranes have been ruptured for 4 hours.  Pelvic exam:complete/+1 station, suspect ROP  Pitocin- none,  Antibiotics- none  S/P 2 doses vaginal Misoprostol, last at 1835.    A:  IUP @ 39w1d good progress   Fetal Heart rate tracing category two  GBS- negative  Patient Active Problem List   Diagnosis     Encounter for induction of labor     Multigravida of advanced maternal age in third trimester     Prior pregnancy with fetal demise     Supervision of high risk pregnancy, antepartum     Not immune to hepatitis B virus     History of ectopic pregnancy     Recurrent pregnancy loss, currently pregnant     Spontaneous          P:  Will attempt maternal position changes with side lying release to help facilitate fetal rotation   Continue active pushing efforts at this time.  JOAN Coelho CNM  "

## 2023-07-12 NOTE — PLAN OF CARE
Problem: Plan of Care - These are the overarching goals to be used throughout the patient stay.    Goal: Optimal Comfort and Wellbeing  Outcome: Progressing  Intervention: Monitor Pain and Promote Comfort  Recent Flowsheet Documentation  Taken 2023 1146 by Priyanka Sanchez, RN  Pain Management Interventions: medication (see MAR)  Taken 2023 0911 by Priyanka Sanchez, RN  Pain Management Interventions: medication (see MAR)   Goal Outcome Evaluation:      Plan of Care Reviewed With: patient    Overall Patient Progress: improvingOverall Patient Progress: improving     VSS. Pain/cramping managed with ibuprofen and tylenol. Able to void. Positive bonding observed with .  at bedside and attentive to patient.

## 2023-07-12 NOTE — PROGRESS NOTES
[facility] Postpartum Progress Note  2023 8:00 AM  Date of service: 2023.         Interval History:   Yolanda Damian 43 year old  who presented at 39w0d for IOL d/t AMA and recurrent pregnancy loss. Patient had a term AGA infant on .    Pain controlled? yes  Regular diet? yes  Voiding? yes  Lochia? Present (volume similar to normal menses)  Breastfeeding? Yes (going well)  Contraception? Desires OCP after discharge (aware of recommendation for pelvic rest for 6 weeks postpartum)         Physical Exam:     Vitals:    23 0607 23 0608 23 0612 23 0617   BP:  100/59     Resp:       Temp:       TempSrc:       SpO2: 97%  97% 96%   Weight:       Height:           GENERAL:         healthy, alert and no distress  RESPIRATORY:   No increased work of breathing, good air exchange, clear to auscultation bilaterally, no crackles or wheezing   CARDIOVASCULAR:   Regular rate and rhythm, normal S1 and S2, no S3 or S4, and no murmur noted   ABDOMEN:   No scars, normal bowel sounds, soft, non-distended, non-tender, no masses palpated, no hepatosplenomegally  Fundal height at level of umbilicus + 1 cm.  Firm and non tender.            Data:     Hemoglobin   Date Value Ref Range Status   2023 12.7 11.7 - 15.7 g/dL Final     No results found for: RH, RUBELLAABIGG         Assessment and Plan:    Yolanda Damian is a 43 year old  PPD#1 s/p Induced vaginal delivery..  L&D complications:  Category two FHT tracing and Prolonged 2nd Stage (>2 hr pr)     Patient Active Problem List   Diagnosis     Encounter for induction of labor     Multigravida of advanced maternal age in third trimester     Prior pregnancy with fetal demise     Supervision of high risk pregnancy, antepartum     Not immune to hepatitis B virus     History of ectopic pregnancy     Recurrent pregnancy loss, currently pregnant      Spontaneous       (normal spontaneous vaginal delivery)       Pain: controlled with ibuprofen and tylenol  Anemia?AM Hgb pending  Diet: Normal   Encourage ambulation  Baby feeding by breast  Contraception: oral contraceptives  Influenza vaccine: N/A (not flu season)   Rh +: Rhogam not indicated  Rubella immune: MMR not indicated  Tdap (for pertussis): 23  Counseled about: breastfeeding, post partum blues/postpartum depression  Dispo: Routine post-partum cares, anticipate DC home PPD# 2.    # Pain Assessment:      2023     7:25 PM   Current Pain Score   Patient currently in pain? Minimal (controlled w/ ibuprofen)   - Yolanda is experiencing pain due to recent vaginal delivery. Pain management was discussed with Yolanda and her spouse and the plan was created in a collaborative fashion.  Yolanda's response to the current recommendations: engaged  - Please see the plan for pain management as documented above        Daylin Woodward MD

## 2023-07-12 NOTE — L&D DELIVERY NOTE
Delivery Summary    Yolanda Damian MRN# [id number]   Age: 43 year old YOB: 1980     ASSESSMENT & PLAN:   DELIVERY NOTE:  Brief Labor Course: Patient presented for induction of labor secondary to advanced maternal age and recurrent pregnancy loss.  Patient received 2 doses of vaginal Misoprostol with regular contractions.  Spontaneous rupture of membranes occurred and patient progressed into active labor.  She requested and received a labor epidural with adequate analgesia.  Patient progressed to complete about 2 hours after epidural.  Category 2 tracing noted and active pushing efforts began.  Fetal heart rate tracing with baseline 145-150 with periods of minimal variability with early decelerations with leon to . Patient making slow steady progress with active pushing efforts.   NICU called to room for delivery.    Delivery Note:   Fetal head brought to a crown with significant caput and delivered MOA, restituted ROT and anterior shoulder delivered without difficulty.  Fetal body had slow delivery due to lack of maternal efforts.  Male infant placed on maternal abdomen, HR >100 with slow respiratory efforts.  Cord doubly clamped and cut by father of the baby and brought to warmer for transition with NICU staff.  Pitocin infusing after delivery of infant prior to delivery of placenta. Cord segment sent for gases and sample obtained for blood typing.  Placenta delivered spontaneously intact with 3 vessel cord via Schultze mechanism.  Fundus firm with massage.  Perineum inspected and found to be intact.  Infant stabilized in room and returned to mother prior to delivery of placenta.   Mother and infant stable at this time.  IUP at 39 weeks gestation delivered on 2023.     delivery of a viable Male infant.  Weight : 3360gms 7lbs 6.5oz  Apgars of 8 at 1 minute and 9 at 5 minutes.  Labor was induced.  Medications administered  in labor:  Pain Rx Epidural; Antibiotics  No  Perineum: Intact  Placenta-mechanism: spontaneous, intact,  with a 3 vessel cord. IV oxytocin was given After delivery of baby Before delivery of placenta  Quantitative Blood Loss was 50cc.   Complications of labor and delivery: Category two FHT tracing and Prolonged 2nd Stage (>2 hr pr)  Anticipated Discharge Date: 2023  Birth attendants: MING Smith APRN CNM       Tim Chowdary [[id number]]    Labor Event Times    Latent labor onset date/time: 2023    Active labor onset date: 23 Onset time:  8:05 PM CDT   Dilation complete date: 23 Complete time: 12:02 AM   Start pushing date/time: 2023 0004      Labor Events     labor?: No   steroids: None  Labor Type: Induction/Cervical ripening  Predominate monitoring during 1st stage: continuous electronic fetal monitoring     Antibiotics received during labor?: No     Rupture identifier: Sac 1  Rupture date/time: 23   Rupture type: Spontaneous Rupture of Membranes  Fluid color: Clear  Fluid odor: Normal     Induction: Misoprostol  Induction date/time:      Cervical ripening date/time:      Indications for induction: Advanced Maternal Age     Augmentation: None     Delivery/Placenta Date and Time    Delivery Date: 23 Delivery Time:  3:55 AM   Delivering clinician: Aym Aponte APRN CNM   Other personnel present at delivery:  Provider Role   Stefanie Vasquez RN Delivery Nurse   Nba Butler RN Delivery Assist         Vaginal Counts     Initial count performed by 2 team members:  Two Team Members   Amy Vasquez RN       Needles Suture Needles Sponges (RETIRED) Instruments   Initial counts 2  5    Added to count       Relief counts       Final counts             Placed during labor Accounted for at the end of labor   FSE     IUPC     Cervidil                       Apgars    Living status: Living   1 Minute 5 Minute 10 Minute 15 Minute 20 Minute    Skin color: 1  1       Heart rate: 2  2       Reflex irritability: 2  2       Muscle tone: 1  2       Respiratory effort: 2  2       Total: 8  9       Apgars assigned by: JESSICA HUERTA     Cord    Vessels: 3 Vessels    Cord Complications: None               Delayed cord clamping?: Yes    Cord Clamping Delay (seconds): 31-60 seconds    Stem cell collection?: No       Matlock Resuscitation    Methods: Oximetry  Matlock Care at Delivery: Asked by Dr. Stewart to attend the delivery of this term, male infant with a gestational age of 39 1/7 weeks secondary to category II tracings.      60 seconds of delayed cord clamping were completed.  The infant was stimulated, cried and had spontaneous respirations during delayed cord clamping. The infant was placed on a warmer, dried and stimulated. Infant was initially stunned but quickly woke up with vigorous stimulation and deep suctioning. Heart rate and sats within range. Gross PE is WNL.  Infant required no further resuscitation.  Infant was given to mother and handed off to L&D RN. NICU care not indicated.    JOAN Huerta NNP-BC  23, 4:17 AM    Advanced Practice Providers  [facility]        Labor Events and Shoulder Dystocia    Fetal Tracing Prior to Delivery: Category 2  Fetal Tracing Comments: During second stage of labor, intermittant minimal variability with early decelerations.  Shoulder dystocia present?: Neg     Delivery (Maternal) (Provider to Complete) ([id number])    Episiotomy: None  Perineal lacerations: None    Repair suture: None  Genital tract inspection done: Pos     Blood Loss  Mother: Yolanda Chowdary #[id number]   Start of Mother's Information    Delivery Blood Loss  23 - 23 0418    None           End of Mother's Information  Mother: Katie Yolanda Damian #[id number]          Delivery - Provider to Complete ([id number])    Delivering clinician: Amy Aponte APRN  "CNM  Delivery Type (Choose the 1 that will go to the Birth History): Vaginal, Spontaneous                   Other personnel:  Provider Role   Stefanie Vasquez, RN Delivery Nurse   Nba Butler, JOHNY Delivery Assist                Placenta    Removal: Spontaneous  Comments: Intact via Schultze mechanism with 3 vessel cord  Disposition: Hospital disposal           Anesthesia    Method: Nitrous Oxide, Epidural                       JOAN Coelho CNM     Addendum 0615:  RN asked provider to come to bedside to evaluate palpable \"lump\" in patient abdomen.  Exam completed by provider, fundus firm at umbilicus with small 3cm mobile mass noted.    Will notify on-coming CNM to have Candice's team evaluate this further.  Patient is asymptomatic and has not had excessive blood loss.  JOAN Coelho CNM    "

## 2023-07-12 NOTE — PLAN OF CARE
"Yolanda responded well to the 1120 Miso with regular contractions until 1700 when they began stopped. FHT's reassuring, denies pain, no bleeding or leaking fluid, usual FM. Interpretor used as needed for pt updates and education. Second dose of Miso placed at 1825 when contractions spaced out. Pt has been comfortable, ambulating and sitting on birthing ball.       Problem: Plan of Care - These are the overarching goals to be used throughout the patient stay.    Goal: Plan of Care Review  Description: The Plan of Care Review/Shift note should be completed every shift.  The Outcome Evaluation is a brief statement about your assessment that the patient is improving, declining, or no change.  This information will be displayed automatically on your shift note.  Flowsheets (Taken 7/11/2023 1422)  Plan of Care Reviewed With:   patient   spouse  Overall Patient Progress: improving  Goal: Patient-Specific Goal (Individualized)  Description: You can add care plan individualizations to a care plan. Examples of Individualization might be:  \"Parent requests to be called daily at 9am for status\", \"I have a hard time hearing out of my right ear\", or \"Do not touch me to wake me up as it startles me\".  Flowsheets (Taken 7/11/2023 1422)  Individualized Care Needs:  required for communication, and medical discussions / teaching  Goal: Optimal Comfort and Wellbeing  Intervention: Provide Person-Centered Care  Recent Flowsheet Documentation  Taken 7/11/2023 0958 by Leyla Tapia RN  Trust Relationship/Rapport:   care explained   questions answered   reassurance provided     Problem: Labor  Goal: Stable Fetal Wellbeing  7/11/2023 1904 by Leyla Tapia RN  Outcome: Progressing  7/11/2023 1422 by Leyla Tapia RN  Outcome: Progressing  Goal: Effective Progression to Delivery  Outcome: Progressing  Goal: Acceptable Pain Control  Outcome: Progressing  Goal: Normal Uterine Contraction Pattern  7/11/2023 1904 by Leyla Tapia" RN  Outcome: Progressing  7/11/2023 1422 by Leyla Tapia RN  Outcome: Progressing     Problem: Labor  Goal: Stable Fetal Wellbeing  7/11/2023 1904 by Leyla Tapia RN  Outcome: Progressing  7/11/2023 1422 by Leyla Tapia RN  Outcome: Progressing  Goal: Effective Progression to Delivery  Outcome: Progressing  Goal: Acceptable Pain Control  Outcome: Progressing  Goal: Normal Uterine Contraction Pattern  7/11/2023 1904 by Leyla Tapia RN  Outcome: Progressing  7/11/2023 1422 by Leyla Tapia RN  Outcome: Progressing     Problem: Maternal-Fetal Wellbeing  Goal: Optimal Maternal-Fetal Wellbeing  Outcome: Progressing   Goal Outcome Evaluation:      Plan of Care Reviewed With: patient, spouse    Overall Patient Progress: improvingOverall Patient Progress: improving

## 2023-07-12 NOTE — PLAN OF CARE
Pt afebrile and VSS. Fundus firm and midline. Pt was able to void on her own and denies pain at this time. QBL is 121 ml. Upon fundal assessment RN felt a mass on pt's abdomen about the size of a ping-pong ball. Provider called to beside to assess and was not concerned with finding. Provider did not think it was a blood clot. Provider was also ok with pt's blood pressures being in the low 90s systolic. Report given to Victor Manuel OLSON RN to transfer pt to [facility].

## 2023-07-12 NOTE — PROGRESS NOTES
Vaginal Delivery Note   of viable Male with Amy Aponte CNM in attendance.  NICU/Nursery, Padmini LORENZO RN  present.  Infant with spontaneous cry, to mother's abdomen, dried and stimulated.  APGAR at 1 minute:  8 and APGAR at 5 minutes:  9. Placenta delivered with out complication, no laceration or repair, viki cares provided.  Mother and baby in stable condition.

## 2023-07-12 NOTE — PROGRESS NOTES
"Labor progress note    S:  Patient breathing well with contractions, consents to cervical exam at this time.  Patient had spontaneous rupture of membranes around  this evening for clear fluid, contractions much stronger after that.      O:  Blood pressure 111/63, temperature 97.5  F (36.4  C), temperature source Oral, resp. rate 18, height 1.575 m (5' 2\"), weight 79.4 kg (175 lb), last menstrual period 10/19/2022.  General appearance: uncomfortable with contractions.  Contractions: Every 1-2 minutes.  seconds duration.  Palpate: strong.  FHT: Baseline 145 with moderate variability. Accelerations are present. Isolated variable decelerations present.  ROM: clear fluid. Membranes have been ruptured for 2.5 hours.  Pelvic exam: 6/ 100%/ Mid/ soft/ +1  Pitocin- none,  Antibiotics- none  S/P 2 doses of vaginal Misoprostol, last at 1835    A:  IUP @ 39w0d active labor and good progress   Fetal Heart rate tracing category one  GBS- negative  Patient Active Problem List   Diagnosis     Encounter for induction of labor     Multigravida of advanced maternal age in third trimester     Prior pregnancy with fetal demise     Supervision of high risk pregnancy, antepartum     Not immune to hepatitis B virus     History of ectopic pregnancy     Recurrent pregnancy loss, currently pregnant     Spontaneous          P:  Patient now requesting labor epidural, anesthesia team notified of patient request   Anticipate   Amy Aponte, JOAN LAI  "

## 2023-07-12 NOTE — ANESTHESIA PROCEDURE NOTES
"Epidural catheter Procedure Note    Pre-Procedure   Staff -        Anesthesiologist:  Vik Peres MD       Resident/Fellow: Azam Torres MD       Performed By: resident and with residents       Procedure performed by resident/fellow/CRNA in presence of a teaching physician.         Location: OB       Pre-Anesthestic Checklist: patient identified, IV checked, risks and benefits discussed, informed consent, monitors and equipment checked, pre-op evaluation, at physician/surgeon's request and post-op pain management  Timeout:       Correct Patient: Yes        Correct Procedure: Yes        Correct Site: Yes        Correct Position: Yes   Procedure Documentation  Procedure: epidural catheter       Patient Position: sitting       Patient Prep/Sterile Barriers: sterile gloves, mask, patient draped       Skin prep: Chloraprep       Local skin infiltrated with mL of 2% lidocaine.        Insertion Site: L3-4. (midline approach).       Technique: LORT saline        LAURA at 4.5 cm.       Needle Type: ToRoovyny needle       Needle Gauge: 17.        Needle Length (Inches): 3.5        Catheter: 19 G.          Catheter threaded easily.         4 cm epidural space.         Threaded 8.5 cm at skin.         # of attempts: 1 and  # of redirects:  0    Assessment/Narrative         Paresthesias: No.       Test dose of 3 mL lidocaine 1.5% w/ 1:200,000 epinephrine at 22:50 CDT.         Test dose negative, 3 minutes after injection, for signs of intravascular, subdural, or intrathecal injection.       Insertion/Infusion Method: LORT saline       Aspiration negative for Heme or CSF via Epidural Catheter.    Medication(s) Administered   0.125% bupivacaine (Epidural) - EPIDURAL   8 mL - 7/11/2023 10:56:00 PM   Comments:  Routine labor epidural placement without complications.      FOR [facility] ([facility]) ONLY:   Pain Team Contact information: please page the Pain Team Via [employer]. Search \"Pain\". During daytime hours, please " page the attending first. At night please page the resident first.

## 2023-07-12 NOTE — ANESTHESIA PREPROCEDURE EVALUATION
Anesthesia Pre-Procedure Evaluation    Patient: Yolanda Damian   MRN: [id number] : 1980        Procedure : * No procedures listed *          History reviewed. No pertinent past medical history.   History reviewed. No pertinent surgical history.   No Known Allergies   Social History     Tobacco Use     Smoking status: Never     Smokeless tobacco: Never   Substance Use Topics     Alcohol use: Not on file      Wt Readings from Last 1 Encounters:   23 79.4 kg (175 lb)        Anesthesia Evaluation   Pt has not had prior anesthetic         ROS/MED HX  ENT/Pulmonary:  - neg pulmonary ROS     Neurologic:  - neg neurologic ROS     Cardiovascular:  - neg cardiovascular ROS     METS/Exercise Tolerance:     Hematologic:  - neg hematologic  ROS     Musculoskeletal:       GI/Hepatic:  - neg GI/hepatic ROS     Renal/Genitourinary:       Endo:  - neg endo ROS     Psychiatric/Substance Use:  - neg psychiatric ROS     Infectious Disease:       Malignancy:       Other:   AMA  Recurrent fetal demise         Physical Exam    Airway        Mallampati: II   TM distance: > 3 FB   Neck ROM: full   Mouth opening: > 3 cm    Respiratory Devices and Support         Dental  no notable dental history         Cardiovascular   cardiovascular exam normal          Pulmonary   pulmonary exam normal                OUTSIDE LABS:  CBC:   Lab Results   Component Value Date    HGB 12.7 2023     BMP: No results found for: NA, POTASSIUM, CHLORIDE, CO2, BUN, CR, GLC  COAGS: No results found for: PTT, INR, FIBR  POC: No results found for: BGM, HCG, HCGS  HEPATIC: No results found for: ALBUMIN, PROTTOTAL, ALT, AST, GGT, ALKPHOS, BILITOTAL, BILIDIRECT, DIANA  OTHER: No results found for: PH, LACT, A1C, SANA, PHOS, MAG, LIPASE, AMYLASE, TSH, T4, T3, CRP, SED    Anesthesia Plan    ASA Status:  3      Anesthesia Type: Epidural.              Consents    Anesthesia Plan(s) and associated risks, benefits, and realistic alternatives  discussed. Questions answered and patient/representative(s) expressed understanding.    - Discussed:     - Discussed with:  Patient         Postoperative Care            Comments:                Azam Torres MD

## 2023-07-13 ENCOUNTER — APPOINTMENT (OUTPATIENT)
Dept: INTERPRETER SERVICES | Facility: CLINIC | Age: 43
End: 2023-07-13
Payer: MEDICAID

## 2023-07-13 VITALS
BODY MASS INDEX: 31.25 KG/M2 | HEART RATE: 71 BPM | OXYGEN SATURATION: 97 % | WEIGHT: 169.8 LBS | TEMPERATURE: 97.9 F | DIASTOLIC BLOOD PRESSURE: 64 MMHG | HEIGHT: 62 IN | RESPIRATION RATE: 16 BRPM | SYSTOLIC BLOOD PRESSURE: 103 MMHG

## 2023-07-13 LAB — HGB BLD-MCNC: 12.3 G/DL (ref 11.7–15.7)

## 2023-07-13 PROCEDURE — 250N000013 HC RX MED GY IP 250 OP 250 PS 637: Performed by: ADVANCED PRACTICE MIDWIFE

## 2023-07-13 PROCEDURE — 36415 COLL VENOUS BLD VENIPUNCTURE: CPT | Performed by: ADVANCED PRACTICE MIDWIFE

## 2023-07-13 PROCEDURE — 99238 HOSP IP/OBS DSCHRG MGMT 30/<: CPT | Mod: GC | Performed by: STUDENT IN AN ORGANIZED HEALTH CARE EDUCATION/TRAINING PROGRAM

## 2023-07-13 PROCEDURE — 85018 HEMOGLOBIN: CPT | Performed by: ADVANCED PRACTICE MIDWIFE

## 2023-07-13 RX ORDER — ACETAMINOPHEN 325 MG/1
650 TABLET ORAL EVERY 4 HOURS PRN
Qty: 180 TABLET | Refills: 0 | Status: SHIPPED | OUTPATIENT
Start: 2023-07-13 | End: 2023-08-12

## 2023-07-13 RX ORDER — IBUPROFEN 800 MG/1
800 TABLET, FILM COATED ORAL EVERY 6 HOURS PRN
Qty: 120 TABLET | Refills: 0 | Status: SHIPPED | OUTPATIENT
Start: 2023-07-13

## 2023-07-13 RX ORDER — BISACODYL 10 MG
10 SUPPOSITORY, RECTAL RECTAL DAILY PRN
Qty: 10 SUPPOSITORY | Refills: 0 | Status: SHIPPED | OUTPATIENT
Start: 2023-07-13

## 2023-07-13 RX ORDER — DOCUSATE SODIUM 100 MG/1
100 CAPSULE, LIQUID FILLED ORAL DAILY
Qty: 30 CAPSULE | Refills: 0 | Status: SHIPPED | OUTPATIENT
Start: 2023-07-14

## 2023-07-13 RX ADMIN — DOCUSATE SODIUM 100 MG: 100 CAPSULE, LIQUID FILLED ORAL at 09:03

## 2023-07-13 RX ADMIN — ACETAMINOPHEN 650 MG: 325 TABLET, FILM COATED ORAL at 16:24

## 2023-07-13 RX ADMIN — IBUPROFEN 800 MG: 800 TABLET ORAL at 16:24

## 2023-07-13 RX ADMIN — IBUPROFEN 800 MG: 800 TABLET ORAL at 09:02

## 2023-07-13 ASSESSMENT — ACTIVITIES OF DAILY LIVING (ADL)
FALL_HISTORY_WITHIN_LAST_SIX_MONTHS: NO
ADLS_ACUITY_SCORE: 18
CHANGE_IN_FUNCTIONAL_STATUS_SINCE_ONSET_OF_CURRENT_ILLNESS/INJURY: NO
HEARING_DIFFICULTY_OR_DEAF: NO
ADLS_ACUITY_SCORE: 31
DIFFICULTY_EATING/SWALLOWING: NO
ADLS_ACUITY_SCORE: 18
WALKING_OR_CLIMBING_STAIRS_DIFFICULTY: NO
DIFFICULTY_COMMUNICATING: NO
WEAR_GLASSES_OR_BLIND: NO
ADLS_ACUITY_SCORE: 18
ADLS_ACUITY_SCORE: 31
TOILETING_ISSUES: NO
DOING_ERRANDS_INDEPENDENTLY_DIFFICULTY: NO
DRESSING/BATHING_DIFFICULTY: NO
ADLS_ACUITY_SCORE: 31
ADLS_ACUITY_SCORE: 18
ADLS_ACUITY_SCORE: 31
ADLS_ACUITY_SCORE: 18
ADLS_ACUITY_SCORE: 18
CONCENTRATING,_REMEMBERING_OR_MAKING_DECISIONS_DIFFICULTY: NO

## 2023-07-13 NOTE — PLAN OF CARE
Problem: Plan of Care - These are the overarching goals to be used throughout the patient stay.    Goal: Readiness for Transition of Care  Outcome: Progressing  Intervention: Mutually Develop Transition Plan  Recent Flowsheet Documentation  Taken 7/13/2023 0800 by Priyanka Sanchez, JOHNY  Equipment Currently Used at Home: none   Goal Outcome Evaluation:  VSS. Pain managed with ibuprofen. Progressing on pathway, anticipate discharge this evening.

## 2023-07-13 NOTE — DISCHARGE INSTRUCTIONS
Cuándo llamar al proveedor de atención médica  Llame al proveedor de atención médica de inmediato ante cualquiera de los siguientes signos o síntomas:   Fiebre de 100.4   F ( 38.0  C) o superior, o según le indique walton proveedor  Sangrado que necesita olga toalla sanitaria cada olga hora, o coágulos de braxton grandes. La hemorragia posparto es un sangrado más abundante de lo normal después del nacimiento de un bebé. Suele suceder después de la expulsión de la placenta. Carla también puede ocurrir más tarde.  Dolor en la vagina que empeora y no se rae con los medicamentos  Hinchazón, flujo o aumento de dolor del desgarro o de la episiotomía vaginal  Ardor, dolor, estrías gifford o zonas con protuberancias en las mamas que pueden presentarse con síntomas amrik los de la gripe  Grietas, ampollas o braxton en los pezones  Ardor o dolor al orinar  Náuseas o vómito  Mareos o desmayos  Sensación de mucha tristeza o ansiedad, o de que no quiere estar con walton bebé  Dolor abdominal que no se rae con los medicamentos  Secreción vaginal con olor desagradable  Falta de evacuación por 5 devan  Dolor al orinar o imposibilidad de retener la orina  Enrojecimiento, calor o dolor en la parte inferior de la pierna  Dolor de pecho    Warning Signs after Having a Baby    Keep this paper on your fridge or somewhere else where you can see it.    Call your provider if you have any of these symptoms up to 12 weeks after having your baby.    Thoughts of hurting yourself or your baby  Pain in your chest or trouble breathing  Severe headache not helped by pain medicine  Eyesight concerns (blurry vision, seeing spots or flashes of light, other changes to eyesight)  Fainting, shaking or other signs of a seizure    Call 9-1-1 if you feel that it is an emergency.     The symptoms below can happen to anyone after giving birth. They can be very serious. Call your provider if you have any of these warning signs.    My provider s phone number:  _______________________    Losing too much blood (hemorrhage)    Call your provider if you soak through a pad in less than an hour or pass blood clots bigger than a golf ball. These may be signs that you are bleeding too much.    Blood clots in the legs or lungs    After you give birth, your body naturally clots its blood to help prevent blood loss. Sometimes this increased clotting can happen in other areas of the body, like the legs or lungs. This can block your blood flow and be very dangerous.     Call your provider if you:  Have a red, swollen spot on the back of your leg that is warm or painful when you touch it.   Are coughing up blood.     Infection    Call your provider if you have any of these symptoms:  Fever of 100.4 F (38 C) or higher.  Pain or redness around your stitches if you had an incision.   Any yellow, white, or green fluid coming from places where you had stitches or surgery.    Mood Problems (postpartum depression)    Many people feel sad or have mood changes after having a baby. But for some people, these mood swings are worse.     Call your provider right away if you feel so anxious or nervous that you can't care for yourself or your baby.    Preeclampsia (high blood pressure)    Even if you didn't have high blood pressure when you were pregnant, you are at risk for the high blood pressure disease called preeclampsia. This risk can last up to 12 weeks after giving birth.     Call your provider if you have:   Pain on your right side under your rib cage  Sudden swelling in the hands and face    Remember: You know your body. If something doesn't feel right, get medical help.     For informational purposes only. Not to replace the advice of your health care provider. Copyright 2020 [facility]. All rights reserved. Clinically reviewed by Cathryn Natarajan, RNC-OB, MSN. [employer] 597378 - Rev 02/23.    Vaginal Delivery Discharge Instructions: [place]  Actividad:   Pida a los miembros  de walton rosanne y amigos que la ayuden cuando lo necesite.  No ponga nada en walton vagina hasta que walton médico lo permita.  Tómese las próximas semanas con calma para que walton cuerpo tenga tiempo de recuperarse. En shahnaz momento puede hacer cualquier actividad que sienta que puede.  No conduzca si está tomando píldoras para el dolor recetadas por walton médico. Puede conducir si está tomando píldoras de venta steven para el dolor.    Llame a walton proveedor de atención médica si tiene alguno de estos síntomas:  Empapa olga toalla femenina con braxton en el correr de 1 hora o ve coágulos más grandes que olga pelota de golf.  Sangrado que dura más de 6 semanas.  Tiene olga secreción vaginal que huele mal.   Fiebre de 100.4  F (38  C) o más (temperatura tomada bajo walton lengua) con o sin escalofríos   Dolor, calambres o sensibilidad graves en la región inferior de walton vientre.  Aumento del dolor, hinchazón, enrojecimiento o líquido alrededor de blanca puntos.  Necesidad más frecuente o urgente de orinar (hacer pis), o ardor al hacerlo.  Enrojecimiento, hinchazón o dolor alrededor de olga vena en walton pierna.  Problemas para amamantar o un área enrojecida o dolorosa en walton pecho.  Dolor que aumenta o no se va de olga episiotomía o desgarro en el perineo.  Náuseas y vómitos  Dolor en el pecho y tos o dificultad para respirar.  Problemas para manejar la tristeza, ansiedad o depresión.   Si le preocupa hacerse daño o hacerle daño al bebé, llame al médico de inmediato.   Tiene preguntas o inquietudes después de regresar a casa.    Mantenga blanca shola limpias:  Lávese siempre las shola antes de tocar el área de walton perineo y los puntos.  [place] ayuda a reducir walton riesgo de infección.  Si blanca shola no están sucias, puede usar un gel de alcohol para limpiarse las shola. Mantenga blanca uñas cortas y limpias.      Vaginal Delivery Discharge Instructions  Activity:   Ask family and friends for help when you need it.  Do not place anything in your vagina until your  doctor approves.  Take it easy for the next few weeks to allow your body to recover. You may do any activities you feel up to at that point.  Do not drive while taking pain pills prescribed by your doctor. You may drive if taking over-the-counter pain pills.    Call your health care provider if you have any of these symptoms:  You soak a sanitary pad with blood within 1 hour, or you see blood clots larger than a golf ball.  Bleeding that lasts more than 6 weeks.  You have vaginal discharge that smells bad.   A fever of 100.4  F (38  C) or higher (temperature taken under your tongue), with or without chills   Severe, pain, cramping or tenderness in your lower belly area.  Increased pain, swelling, redness or fluid around your stitches.  A more frequent or urgent need to urinate (pee), or it burns when you pee.  Redness, swelling or pain around a vein in your leg.  Problems breastfeeding, or a red or painful area on your breast.  Pain that increases or does not go away from an episiotomy or perineal tear.  Nausea and vomiting.  Chest pain and cough or are gasping for air.  Problems coping with sadness, anxiety, or depression.   If you have any concerns about hurting yourself or the baby, call your doctor right away.   You have questions or concerns after you return home.    Keep your hands clean:  Always wash your hands before touching your perineal area and stitches.  This helps reduce your risk of infection.  If your hands aren t dirty, you may use an alcohol hand-rub to clean your hands. Keep your nails clean and short.    Vaginal Delivery Discharge Instructions: [place]  Actividad:   Pida a los miembros de walton rosanne y amigos que la ayuden cuando lo necesite.  No ponga nada en walton vagina hasta que walton médico lo permita.  Tómese las próximas semanas con calma para que walton cuerpo tenga tiempo de recuperarse. En shahnaz momento puede hacer cualquier actividad que sienta que puede.  No conduzca si está tomando píldoras para  el dolor recetadas por walton médico. Puede conducir si está tomando píldoras de venta steven para el dolor.    Llame a walton proveedor de atención médica si tiene alguno de estos síntomas:  Empapa olga toalla femenina con braxton en el correr de 1 hora o ve coágulos más grandes que olga pelota de golf.  Sangrado que dura más de 6 semanas.  Tiene olga secreción vaginal que huele mal.   Fiebre de 100.4  F (38  C) o más (temperatura tomada bajo walton lengua) con o sin escalofríos   Dolor, calambres o sensibilidad graves en la región inferior de walton vientre.  Aumento del dolor, hinchazón, enrojecimiento o líquido alrededor de blanca puntos.  Necesidad más frecuente o urgente de orinar (hacer pis), o ardor al hacerlo.  Enrojecimiento, hinchazón o dolor alrededor de olga vena en walton pierna.  Problemas para amamantar o un área enrojecida o dolorosa en walton pecho.  Dolor que aumenta o no se va de olga episiotomía o desgarro en el perineo.  Náuseas y vómitos  Dolor en el pecho y tos o dificultad para respirar.  Problemas para manejar la tristeza, ansiedad o depresión.   Si le preocupa hacerse daño o hacerle daño al bebé, llame al médico de inmediato.   Tiene preguntas o inquietudes después de regresar a casa.    Mantenga blanca shola limpias:  Lávese siempre las shola antes de tocar el área de walton perineo y los puntos.  [place] ayuda a reducir walton riesgo de infección.  Si blanca shola no están sucias, puede usar un gel de alcohol para limpiarse las shola. Mantenga blanca uñas cortas y limpias.      Vaginal Delivery Discharge Instructions  Activity:   Ask family and friends for help when you need it.  Do not place anything in your vagina until your doctor approves.  Take it easy for the next few weeks to allow your body to recover. You may do any activities you feel up to at that point.  Do not drive while taking pain pills prescribed by your doctor. You may drive if taking over-the-counter pain pills.    Call your health care provider if you have any of these  symptoms:  You soak a sanitary pad with blood within 1 hour, or you see blood clots larger than a golf ball.  Bleeding that lasts more than 6 weeks.  You have vaginal discharge that smells bad.   A fever of 100.4  F (38  C) or higher (temperature taken under your tongue), with or without chills   Severe, pain, cramping or tenderness in your lower belly area.  Increased pain, swelling, redness or fluid around your stitches.  A more frequent or urgent need to urinate (pee), or it burns when you pee.  Redness, swelling or pain around a vein in your leg.  Problems breastfeeding, or a red or painful area on your breast.  Pain that increases or does not go away from an episiotomy or perineal tear.  Nausea and vomiting.  Chest pain and cough or are gasping for air.  Problems coping with sadness, anxiety, or depression.   If you have any concerns about hurting yourself or the baby, call your doctor right away.   You have questions or concerns after you return home.    Keep your hands clean:  Always wash your hands before touching your perineal area and stitches.  This helps reduce your risk of infection.  If your hands aren t dirty, you may use an alcohol hand-rub to clean your hands. Keep your nails clean and short.

## 2023-07-13 NOTE — PLAN OF CARE
Patient arrived to [facility] unit via wheelchair at 0820,with belongings, accompanied by spouse/ significant other, with infant in arms. Received report from JOHNY Ordonez and checked bands. Unit and room orientation started via phone . Call light given; no concerns present at this time. Continue with plan of care.

## 2023-07-13 NOTE — PLAN OF CARE
"Goal Outcome Evaluation:  Shift [id number]  Afebrile. VSS, except 0-5/10. Fundus U1, scant, rubra lochia. LS clear on RA. Good PO intake, good appetite.  Voiding independently, passing gas. Taking IBU and tylenol as needed. Bonding well with infant in room. Helping with infant cares. Patient is breast feeding, pumping  and formula feeding every 2-3 hours. Plan to discharge. Hourly monitoring completed. Discharged at 1700.    Problem: Plan of Care - These are the overarching goals to be used throughout the patient stay.    Goal: Plan of Care Review  Description: The Plan of Care Review/Shift note should be completed every shift.  The Outcome Evaluation is a brief statement about your assessment that the patient is improving, declining, or no change.  This information will be displayed automatically on your shift note.  Outcome: Met  Goal: Patient-Specific Goal (Individualized)  Description: You can add care plan individualizations to a care plan. Examples of Individualization might be:  \"Parent requests to be called daily at 9am for status\", \"I have a hard time hearing out of my right ear\", or \"Do not touch me to wake me up as it startles me\".  Outcome: Met  Goal: Absence of Hospital-Acquired Illness or Injury  Outcome: Met  Intervention: Prevent Skin Injury  Recent Flowsheet Documentation  Taken 7/13/2023 1624 by Azra Mast RN  Body Position: position changed independently  Goal: Optimal Comfort and Wellbeing  Outcome: Met  Intervention: Provide Person-Centered Care  Recent Flowsheet Documentation  Taken 7/13/2023 1624 by Azra Mast RN  Trust Relationship/Rapport:    care explained    choices provided    emotional support provided    empathic listening provided    questions answered    questions encouraged    reassurance provided    thoughts/feelings acknowledged  Goal: Readiness for Transition of Care  Outcome: Met     Problem: Maternal-Fetal Wellbeing  Goal: Optimal Maternal-Fetal " Wellbeing  Intervention: Support Psychosocial Response to Complications During Pregnancy  Recent Flowsheet Documentation  Taken 7/13/2023 1624 by Azra Mast, RN  Family/Support System Care:    involvement promoted    presence promoted    self-care encouraged    support provided     Problem: Postpartum (Vaginal Delivery)  Goal: Successful Maternal Role Transition  Outcome: Met  Goal: Hemostasis  Outcome: Met  Goal: Absence of Infection Signs and Symptoms  Outcome: Met  Goal: Anesthesia/Sedation Recovery  Outcome: Met  Goal: Optimal Pain Control and Function  Outcome: Met  Goal: Effective Urinary Elimination  Outcome: Met

## 2023-07-13 NOTE — PLAN OF CARE
Goal Outcome Evaluation:         Data: Vital signs within normal limits. Postpartum checks within normal limits - see flow record. Patient eating and drinking normally. Patient able to empty bladder independently and is up ambulating. No apparent signs of infection.  Patient performing self cares and is able to care for infant with minimal assist.  Action: Patient medicated during the shift for cramping. See MAR. Patient reassessed within 1 hour after each medication and pain was improved - patient stated she was comfortable. Patient education done about breastfeeding, infant blood sugars, postpartum vaginal cares. See flow record.  Response: Positive attachment behaviors observed with infant. Support persons  present.   Plan: Anticipate discharge on 7/13-7/14.

## 2023-07-13 NOTE — LACTATION NOTE
This note was copied from a baby's chart.  Consult for:  Breastfeeding support    Infant's Primary Care Clinic: [facility] or [facility]    Delivery Information:  Baby boy was born at Gestational Age: 39w1d via vaginal delivery on 2023 3:55 AM     Maternal Health Histfory:  Maternal past medical history, problem list and prior to admission medications reviewed and notable for recurrent fetal demise and AMA      Maternal Breast Exam/Breastfeeding History: Her breasts are soft and symmetrical with bilateral intact nipples. She has been able to hand express colostrum. Denies pain in breasts/nipples     Infant information: baby boy has age appropriate output and weight loss.  Baby has had reoccurring hypoglycemia since yesterday. Supplementing with 24 kcal after every feeding. Plan to decrease to regular formula, see provider note.     Weight Change Since Birth: -5% at 1 day old     Feeding History: Per mother breastfeeding is going well, denies pain in breasts/nipples. BF at 1130 followed by supplementation. LC reviews feeding recommendations: BF on demand, not going longer than 3 hours between feedings, supplement per provider recommendations and follow with 15 minutes of pumping. Mother verbalizes understanding of plan.       Education: Early feeding cues, benefits of feeding on cue, breastfeeding is going well, supply and demand,expected  breastfeeding patterns in the first few days preventing engorgement. Reviewed Infant Feeding Log [facility] Breastfeeding/Pumping videos,and inpatient/outpatient lactation support including [facility] Lactation Resource Handout.     Handouts: [facility] Lactation Resources    Plan: Continue breastfeeding on cue with RN support as needed with a goal of 8-12 feedings per day, supplement per provider recommendations and pump after every feeding.      Encourage frequent skin to skin, breast massage and hand expression.     Encouraged follow up with  outpatient lactation consultant  as needed after discharge. Family plans to follow up with Doyline or  Katie.    In person  used for visit.         Carol Ann Panchal RN, IBCLC   Lactation Consultant  Ascom: *[phone]  Office: [phone]

## 2023-07-13 NOTE — PLAN OF CARE
Goal Outcome Evaluation:  VSS and postpartum assessments WDL.  Up ad mariaelena with steady gait and independent with cares.  Bonding well with infant.  Breastfeeding every 2-3 hours independently. Pumping and giving formula due to baby's hypoglycemia. Denied pain overnight. Partner present and supportive.  will be here this morning to go over paperwork. Will continue with postpartum cares and education per plan of care.

## 2023-07-13 NOTE — DISCHARGE SUMMARY
[facility] Medicine  Post-partum Discharge Summary    Yolanda Damian MRN# [id number]   Age: 43 year old YOB: 1980     Date of Admission:  2023  Date of Discharge::  2023  Admitting Physician:  JOAN Coelho CNM  Discharge Physician:  Tyra Christian DO      Home clinic: [facility]            Admission Diagnoses:   Supervision of high-risk pregnancy of elderly multigravida [O09.529]   (normal spontaneous vaginal delivery) [O80]          Discharge Diagnosis:     Normal spontaneous vaginal delivery  Intrauterine pregnancy at 39 weeks gestation  Patient Active Problem List   Diagnosis     Encounter for induction of labor     Multigravida of advanced maternal age in third trimester     Prior pregnancy with fetal demise     Supervision of high risk pregnancy, antepartum     Not immune to hepatitis B virus     History of ectopic pregnancy     Recurrent pregnancy loss, currently pregnant     Spontaneous       (normal spontaneous vaginal delivery)     Prolonged second stage of labor             Procedures:     Procedure(s): No additional procedures performed               Medications Prior to Admission:     No medications prior to admission.             Discharge Medications:     Current Discharge Medication List      START taking these medications    Details   acetaminophen (TYLENOL) 325 MG tablet Take 2 tablets (650 mg) by mouth every 4 hours as needed for mild pain or fever (greater than or equal to 38 C /100.4 F (oral) or 38.5 C/ 101.4F (core).)  Qty: 180 tablet, Refills: 0    Associated Diagnoses:  (normal spontaneous vaginal delivery)      bisacodyl (DULCOLAX) 10 MG suppository Place 1 suppository (10 mg) rectally daily as needed for constipation  Qty: 10 suppository, Refills: 0    Associated Diagnoses:  (normal spontaneous vaginal delivery)      docusate sodium (COLACE) 100 MG capsule Take 1 capsule (100 mg) by mouth  daily  Qty: 30 capsule, Refills: 0    Associated Diagnoses:  (normal spontaneous vaginal delivery)      ibuprofen (ADVIL/MOTRIN) 800 MG tablet Take 1 tablet (800 mg) by mouth every 6 hours as needed for other (cramping)  Qty: 120 tablet, Refills: 0    Associated Diagnoses:  (normal spontaneous vaginal delivery)                   Consultations:   No consultations were requested during this admission          Brief History of Labor:   On  at 0355, this 43 year old year old  under Epidural analgesia delivered a viable Male infant weighing 3660g with APGAR scores below:  APGARs 1 Min 5Min 10Min   Totals:  8  9              Hospital Course (HPI):   The patient's hospital course was unremarkable.  On discharge, her pain was well controlled. Vaginal bleeding is decreased.  Voiding without difficulty.  Ambulating well and tolerating a normal diet.  No fever. Breast feeding going well.  Infant is stable.  She was discharged on post-partum day #1. Contraception plan: POPs at [facility] follow up. Post partum depression education was provided.    Of note, pt had asymptomatic borderline hypotension postpartum. No tachycardia and on chart review this was similar to BP values in pregnancy. MAPs >60, bleeding minimal with stable Hgb so no further eval or treatment pursued.          D/C Physical Exam:     Vitals:    23 1546 23 2153 23 2356 23 0829   BP: (!) 88/52 92/49 96/54 (!) 88/62   BP Location:   Left arm Left arm   Patient Position:   Semi-Briones's Semi-Briones's   Cuff Size:   Adult Regular Adult Regular   Pulse: 61 70 72 77   Resp: 16 16 16 16   Temp: 98.4  F (36.9  C) 98.6  F (37  C) 98.3  F (36.8  C) 98.9  F (37.2  C)   TempSrc: Oral Oral Oral Oral   SpO2:       Weight:       Height:           GENERAL:         healthy, alert and no distress  RESPIRATORY:   No increased work of breathing, good air exchange, clear to auscultation bilaterally, no crackles or wheezing   CARDIOVASCULAR:    Normal apical impulse, regular rate and rhythm, normal S1 and S2, no S3 or S4, and no murmur noted   ABDOMEN:   No scars, normal bowel sounds, soft, non-distended, non-tender, no masses palpated, no hepatosplenomegally  Fundal height at level of umbilicus.  Firm and non tender.   EXTREMITIES:          no edema, non-tender     Post-partum hemoglobin:   Hemoglobin   Date Value Ref Range Status   07/13/2023 12.3 11.7 - 15.7 g/dL Final           Discharge Instructions and Follow-Up:     Discharge diet: Regular   Discharge activity: Activity as tolerated   Discharge follow-up: Follow up with primary care provider in 2 weeks   Wound care: Drink plenty of fluids  Ice to area for comfort     # Discharge Pain Plan:   - During her hospitalization, Yolanda experienced pain due to cramping.  The pain plan for discharge was discussed with Yolanda and the plan was created in a collaborative fashion.    - Pharmacologic adjuvants:  NSAIDs and Acetaminophen           Discharge Disposition:     Discharged to home        Mother desires IUD or NexplanonCandice s GYN clinic referral placed. Please Schedule  No     Daylin Woodward MD      Attestation:  This patient has been seen and evaluated by me, Tyra Christian DO on 7/13/2023.  I saw and discussed the case with the primary resident and the care team. I agree with the findings and plan in this note. I have reviewed today's vital signs, medications, laboratory results.      Tyra Christian DO

## 2023-07-13 NOTE — PROGRESS NOTES
"    Anesthesia Post-Partum Follow-Up Note After  with Epidural    Patient: Yolanda Damian    Patient location: Post-partum floor.    Anesthesia type: Epidural    Subjective:     She does not complain of pruritis at this time. She denies weakness, denies paresthesia, denies difficulties breathing or voiding, denies nausea or vomiting, and denies headache. She is able to ambulate and tolerates regular diet.    Objective:    Respiratory Function (RR / SpO2 / Airway Patency): Satisfactory    Cardiac Function (HR / Rhythm / BP): Satisfactory    Strength and sensation lower extremities: Normal    Site of epidural insertion: No signs of infection or inflammation.     Last Vitals: BP (!) 88/62 (BP Location: Left arm, Patient Position: Semi-Briones's, Cuff Size: Adult Regular)   Pulse 77   Temp 37.2  C (98.9  F) (Oral)   Resp 16   Ht 1.575 m (5' 2\")   Wt 79.4 kg (175 lb)   LMP 10/19/2022   SpO2 97%   Breastfeeding Unknown   BMI 32.01 kg/m      Assessment and plan:   Yolanda Damian is a 43 year old female  post-partum #1 s/p  with epidural for labor analgesia. This successfully provided labor analgesia. The patient delivered via  and the epidural catheter was removed immediately thereafter by the L&D RN with the catheter tip in tact per chart review.     At this time, there is no evidence of adverse side effects associated with the insertion or removal of the epidural catheter. If the patient develops new lower extremity paresis or paresthesias; or if there are concerns regarding the insertion site of the catheter, please reach out to the Dept of Anesthesiology.    Thank you for including us in the care of this patient.    Ibrahima Kenny MD  Anesthesiology Resident, CA-2, PGY-3      "

## 2023-09-20 ENCOUNTER — TELEPHONE (OUTPATIENT)
Dept: OTHER | Facility: CLINIC | Age: 43
End: 2023-09-20
Payer: MEDICAID

## 2023-09-20 ENCOUNTER — TRANSCRIBE ORDERS (OUTPATIENT)
Dept: OTHER | Age: 43
End: 2023-09-20

## 2023-09-20 DIAGNOSIS — I83.891 VARICOSE VEINS OF LEG WITH SWELLING, RIGHT: Primary | ICD-10-CM

## 2023-09-20 NOTE — TELEPHONE ENCOUNTER
Appt Note:  Referred to [facility] by Corin Vega CNM  for persistent right foot and ankle edema, right leg varicosities developed post-partum    Pt needs to be scheduled for NEW VASCULAR PATIENT with Vascular Medicine.  Will route to scheduling to coordinate an appointment at next available.    Meredith Ma, PAPITON, RN  [facility]

## 2023-09-21 ENCOUNTER — APPOINTMENT (OUTPATIENT)
Dept: INTERPRETER SERVICES | Facility: CLINIC | Age: 43
End: 2023-09-21
Payer: MEDICAID

## 2024-10-09 ENCOUNTER — TRANSFERRED RECORDS (OUTPATIENT)
Dept: HEALTH INFORMATION MANAGEMENT | Facility: CLINIC | Age: 44
End: 2024-10-09
Payer: MEDICAID

## 2024-10-09 ENCOUNTER — MEDICAL CORRESPONDENCE (OUTPATIENT)
Dept: HEALTH INFORMATION MANAGEMENT | Facility: CLINIC | Age: 44
End: 2024-10-09
Payer: MEDICAID

## 2024-10-10 DIAGNOSIS — Z32.01 PREGNANCY TEST POSITIVE: Primary | ICD-10-CM

## 2024-10-23 ENCOUNTER — ANCILLARY PROCEDURE (OUTPATIENT)
Dept: ULTRASOUND IMAGING | Facility: CLINIC | Age: 44
End: 2024-10-23
Attending: MIDWIFE
Payer: MEDICAID

## 2024-10-23 DIAGNOSIS — Z32.01 PREGNANCY TEST POSITIVE: ICD-10-CM

## 2024-10-23 PROCEDURE — 76801 OB US < 14 WKS SINGLE FETUS: CPT | Mod: GC | Performed by: RADIOLOGY

## 2024-10-23 NOTE — NURSING NOTE
Due to patient being non-English speaking/uses sign language, an  was used for this visit. Only for face-to-face interpretation by an external agency, date and length of interpretation can be found on the scanned worksheet.     name: #[id number]  Agency:  [employer]  Language: [place]an   Telephone number: [phone]  Type of interpretation: Telephone, spoken

## 2024-11-20 ENCOUNTER — MEDICAL CORRESPONDENCE (OUTPATIENT)
Dept: HEALTH INFORMATION MANAGEMENT | Facility: CLINIC | Age: 44
End: 2024-11-20
Payer: MEDICAID

## 2024-11-20 ENCOUNTER — TRANSCRIBE ORDERS (OUTPATIENT)
Dept: MATERNAL FETAL MEDICINE | Facility: CLINIC | Age: 44
End: 2024-11-20
Payer: MEDICAID

## 2024-11-20 DIAGNOSIS — O26.90 PREGNANCY RELATED CONDITION, ANTEPARTUM: Primary | ICD-10-CM

## 2024-11-26 ENCOUNTER — PRE VISIT (OUTPATIENT)
Dept: MATERNAL FETAL MEDICINE | Facility: HOSPITAL | Age: 44
End: 2024-11-26
Payer: MEDICAID

## 2024-12-05 ENCOUNTER — HOSPITAL ENCOUNTER (OUTPATIENT)
Dept: ULTRASOUND IMAGING | Facility: CLINIC | Age: 44
End: 2024-12-05
Attending: STUDENT IN AN ORGANIZED HEALTH CARE EDUCATION/TRAINING PROGRAM

## 2024-12-05 ENCOUNTER — OFFICE VISIT (OUTPATIENT)
Dept: MATERNAL FETAL MEDICINE | Facility: CLINIC | Age: 44
End: 2024-12-05
Attending: STUDENT IN AN ORGANIZED HEALTH CARE EDUCATION/TRAINING PROGRAM

## 2024-12-05 DIAGNOSIS — O09.522 MULTIGRAVIDA OF ADVANCED MATERNAL AGE IN SECOND TRIMESTER: Primary | ICD-10-CM

## 2024-12-05 DIAGNOSIS — O26.90 PREGNANCY RELATED CONDITION, ANTEPARTUM: ICD-10-CM

## 2024-12-05 PROCEDURE — 76811 OB US DETAILED SNGL FETUS: CPT

## 2024-12-05 NOTE — NURSING NOTE
used via IPAD during MFM appointment.  Patient here for GC/L2  Patient denies pain, contractions, leaking of fluid, or bleeding.  SBAR given to MFM MD, see their note in Epic.

## 2024-12-06 NOTE — PROGRESS NOTES
The patient was seen for an ultrasound in the [facility] today.  For a detailed report of the ultrasound examination, please see the ultrasound report which can be found under the imaging tab.    If you have questions regarding today's evaluation or if we can be of further service, please contact the [facility].    Eloise Negro MD  , OB/GYN  Maternal-Fetal Medicine

## 2025-01-03 ENCOUNTER — APPOINTMENT (OUTPATIENT)
Dept: INTERPRETER SERVICES | Facility: CLINIC | Age: 45
End: 2025-01-03

## 2025-03-14 ENCOUNTER — MEDICAL CORRESPONDENCE (OUTPATIENT)
Dept: HEALTH INFORMATION MANAGEMENT | Facility: CLINIC | Age: 45
End: 2025-03-14
Payer: MEDICAID

## 2025-03-17 ENCOUNTER — TRANSCRIBE ORDERS (OUTPATIENT)
Dept: MATERNAL FETAL MEDICINE | Facility: CLINIC | Age: 45
End: 2025-03-17
Payer: MEDICAID

## 2025-03-17 ENCOUNTER — APPOINTMENT (OUTPATIENT)
Dept: INTERPRETER SERVICES | Facility: CLINIC | Age: 45
End: 2025-03-17
Payer: MEDICAID

## 2025-03-17 DIAGNOSIS — O26.90 PREGNANCY RELATED CONDITION, ANTEPARTUM: Primary | ICD-10-CM

## 2025-03-25 ENCOUNTER — HOSPITAL ENCOUNTER (OUTPATIENT)
Dept: ULTRASOUND IMAGING | Facility: CLINIC | Age: 45
Discharge: HOME | End: 2025-03-25
Attending: MIDWIFE
Payer: MEDICAID

## 2025-03-25 ENCOUNTER — OFFICE VISIT (OUTPATIENT)
Dept: MATERNAL FETAL MEDICINE | Facility: CLINIC | Age: 45
End: 2025-03-25
Attending: MIDWIFE
Payer: MEDICAID

## 2025-03-25 DIAGNOSIS — O09.523 MULTIGRAVIDA OF ADVANCED MATERNAL AGE IN THIRD TRIMESTER: Primary | ICD-10-CM

## 2025-03-25 DIAGNOSIS — O36.5930 MATERNAL CARE FOR OTHER KNOWN OR SUSPECTED POOR FETAL GROWTH, THIRD TRIMESTER, NOT APPLICABLE OR UNSPECIFIED: ICD-10-CM

## 2025-03-25 DIAGNOSIS — O26.90 PREGNANCY RELATED CONDITION, ANTEPARTUM: ICD-10-CM

## 2025-03-25 PROCEDURE — 76816 OB US FOLLOW-UP PER FETUS: CPT

## 2025-03-25 NOTE — NURSING NOTE
" present for MFM office visit via iPad. Patient reports positive fetal movement, denies pain, denies contractions/pre-term labor, leaking of fluid, or bleeding. Reports blood sugar values fasting and post prandial \"a little high.\" States she has not yet met with Diabetic Education. Patient denies headache, visual changes, nausea/vomiting, epigastric pain related to preeclampsia. Education provided to patient on RL2. SBAR given to MFM MD, see their note in Epic.    Meredith Berg RN        "

## 2025-03-25 NOTE — PROGRESS NOTES
"Please see \"Imaging\" tab under \"Chart Review\" for details of today's visit.    Cristel Javier    "

## 2025-03-26 ENCOUNTER — LAB (OUTPATIENT)
Dept: LAB | Facility: CLINIC | Age: 45
End: 2025-03-26
Attending: OBSTETRICS & GYNECOLOGY
Payer: MEDICAID

## 2025-03-26 ENCOUNTER — VIRTUAL VISIT (OUTPATIENT)
Dept: OBGYN | Facility: CLINIC | Age: 45
End: 2025-03-26
Payer: MEDICAID

## 2025-03-26 ENCOUNTER — OFFICE VISIT (OUTPATIENT)
Dept: MATERNAL FETAL MEDICINE | Facility: CLINIC | Age: 45
End: 2025-03-26
Attending: OBSTETRICS & GYNECOLOGY
Payer: MEDICAID

## 2025-03-26 DIAGNOSIS — O36.5930 MATERNAL CARE FOR OTHER KNOWN OR SUSPECTED POOR FETAL GROWTH, THIRD TRIMESTER, NOT APPLICABLE OR UNSPECIFIED: ICD-10-CM

## 2025-03-26 DIAGNOSIS — O24.419 GESTATIONAL DIABETES MELLITUS, ANTEPARTUM: Primary | ICD-10-CM

## 2025-03-26 DIAGNOSIS — O09.523 MULTIGRAVIDA OF ADVANCED MATERNAL AGE IN THIRD TRIMESTER: ICD-10-CM

## 2025-03-26 DIAGNOSIS — O09.523 MULTIGRAVIDA OF ADVANCED MATERNAL AGE IN THIRD TRIMESTER: Primary | ICD-10-CM

## 2025-03-26 PROBLEM — R19.5 POSITIVE OCCULT STOOL BLOOD TEST: Status: ACTIVE | Noted: 2024-11-22

## 2025-03-26 PROBLEM — O24.410 DIET CONTROLLED GESTATIONAL DIABETES MELLITUS (GDM) IN THIRD TRIMESTER: Status: ACTIVE | Noted: 2025-03-21

## 2025-03-26 PROBLEM — O09.90 SUPERVISION OF HIGH RISK PREGNANCY, ANTEPARTUM: Status: ACTIVE | Noted: 2023-04-28

## 2025-03-26 PROCEDURE — 36415 COLL VENOUS BLD VENIPUNCTURE: CPT

## 2025-03-26 PROCEDURE — 96041 GENETIC COUNSELING SVC EA 30: CPT

## 2025-03-26 RX ORDER — CARVEDILOL 25 MG/1
100 TABLET, FILM COATED ORAL 4 TIMES DAILY
COMMUNITY

## 2025-03-26 RX ORDER — VITAMIN A ACETATE, .BETA.-CAROTENE, ASCORBIC ACID, CHOLECALCIFEROL, .ALPHA.-TOCOPHEROL ACETATE, DL-, THIAMINE MONONITRATE, RIBOFLAVIN, NIACINAMIDE, PYRIDOXINE HYDROCHLORIDE, FOLIC ACID, CYANOCOBALAMIN, CALCIUM CARBONATE, FERROUS FUMARATE, ZINC OXIDE, AND CUPRIC OXIDE 2000; 2000; 120; 400; 22; 1.84; 3; 20; 10; 1; 12; 200; 27; 25; 2 [IU]/1; [IU]/1; MG/1; [IU]/1; MG/1; MG/1; MG/1; MG/1; MG/1; MG/1; UG/1; MG/1; MG/1; MG/1; MG/1
1 TABLET ORAL DAILY
COMMUNITY

## 2025-03-26 NOTE — PROGRESS NOTES
[facility] Fetal Medicine [place]  Genetic Counseling Consult    Patient:  Yolanda Damian YOB: 1980   Date of Service:  3/26/25   MRN: [id number]    Yolanda was seen at the [facility] for genetic consultation. The indication for genetic counseling is advanced maternal age and abnormal fetal ultrasound showing a shortened ulna and tibia. The patient was accompanied to this visit by her partner, David, and their son, Bk.    The session was conducted with a Ukrainian ipad  ([employer]) due to the patient speaking limited English.        IMPRESSION/ PLAN   1. Yolanda has not had genetic screening in this pregnancy but elected to have screening today.     2. During today's [facility] visit, Yolanda had a blood draw for expanded non-invasive prenatal testing (also called NIPT, NIPS, or cell-free DNA) through [email or web address] The expanded NIPT screens for trisomy 21, 18, and 13 and select microdeletion syndromes, including 22q11.2 deletion syndrome. The patient opted to screen for sex chromosome aneuploidies, including reported fetal sex. Results are expected in 7-14 days. The patient will be called with results and if they do not answer they requested a detailed message with results on their voicemail, including the predicted fetal sex information.  Patient was informed that results, including fetal sex, will be available in [facility].    3. Yolanda had an ultrasound with [facility] yesterday which showed a shortened ulna and tibia. Yolanda is returning to [facility] for another ultrasound on 04/08/2025.    4. Yolanda previously had a genetic counseling session on 12/05/2024 where pregnancy history, family history, and carrier screening were covered in detail. These topics were not covered at Yolanda's visit today. Please refer to the genetic counseling note from [facility], on 12/05/2024 for more details.     RISK ASSESSMENT FOR  CHROMOSOME CONDITIONS   We explained that the risk for fetal chromosome abnormalities increases with maternal age. We discussed specific features of common chromosome abnormalities, including Down syndrome, trisomy 13, trisomy 18, and sex chromosome trisomies.    At age 45 at delivery, the risk to have a baby with Down syndrome is 1 in 30.   At age 45 at delivery, the risk to have a baby with any chromosome abnormality is 1 in 20.     Shortened Long Bones  We discussed common aneuploidy markers identified on level II ultrasounds. Aneuploidy means anabnormal number of chromosomes. These markers are used to adjust age-related risk for chromosome abnormalities. While markers are often seen in babies without a chromosome condition, they are seen slightly more often inbabies with a condition. Therefore, each marker is associated with a different increase in risk but alone not diagnose a condition, such a Down syndrome.     Yolanda's ultrasound yesterday at 34w3d identified short fetal tibia and ulna bilaterally. This finding could be normal variation; however, shortened fetal long bones have been associated with Down syndrome. Given this finding, we reviewed that the chance for this pregnancy to have Down syndrome higher than Yolanda's age related risk (1 in 30).    We discussed the options for more information including a cell-free DNA non-invasive screen (NIPT) or  diagnostic testing. We discussed that a screen would provide a more accurate risk assessment for this pregnancy compared to ultrasound by analyzing the cell-free DNA from the placenta in maternal blood. However, diagnostic testing postnatally would be needed to confirm a diagnosis if NIPT screens positive.    Microdeletion Screening  We also discussed that current ACMG guidelines recommend that screening for 22q11.2 deletion syndrome be offered to all pregnant patients. 22q11.2 deletion syndrome has an estimated prevalence of 1 in 990 to 1 in 2148  (0.05-0.1%). Risk is not thought to increase with maternal age. Clinical features are variable but include congenital heart defects, cleft palate, developmental delays, immune system deficiencies, and hearing loss. Approximately 90% of cases are de harriet (a sporadic new change in a pregnancy).     Cell-free DNA screening for 22q11.2 deletion syndrome is available with the inclusion of other microdeletion syndromes (expanded NIPT through [employer]). At this time, it is not possible to only screen for 22q11.2 deletion syndrome without the additional microdeletion syndromes (1p36, 4p, 5p, 15q11). We discussed the limitations of cell-free DNA screening in detecting microdeletions, there is less data about the performance of cell-free DNA screening for more rare microdeletions and the chance for false positives or negatives may be increased. We also discussed that microdeletion syndromes outside of 22q11.2 are not part of universal prenatal screening guidelines.     Mabel Guerrero has not had genetic screening in this pregnancy but elected to have screening today.      GENETIC TESTING OPTIONS FOR CHROMOSOMAL CONDITIONS   Genetic testing during a pregnancy includes screening and diagnostic procedures.      Screening tests are non-invasive which means no risk to the pregnancy and includes ultrasounds and blood work. The benefits and limitations of screening were reviewed. Screening tests provide a risk assessment (chance) specific to the pregnancy for certain fetal chromosome abnormalities but cannot definitively diagnose or exclude a fetal chromosome abnormality. Follow-up genetic counseling and consideration of diagnostic testing is recommended with any abnormal screening result. Diagnostic testing during a pregnancy is more certain and can test for more conditions. However, the tests do have a risk of miscarriage that requires careful consideration. These tests can detect fetal chromosome abnormalities with greater than 99%  certainty. Results can be compromised by maternal cell contamination or mosaicism and are limited by the resolution of current genetic testing technology.     There is no screening or diagnostic test that detects all forms of birth defects or intellectual disability.     We discussed the following screening options:   Non-invasive prenatal testing (NIPT)  Also called cell-free DNA screening because it detects chromosomes from the placenta in the pregnant person's blood  Can be done any time after 10 weeks gestation  Screens for trisomy 21, trisomy 18, trisomy 13, and sex chromosome aneuploidies  Expanded NIPT also allows for reflex to include other microdeletion conditions and rare autosomal trisomies if an indication would arise later in the pregnancy. The patient elected expanded NIPT screening for microdeletion conditions.   Cannot screen for open neural tube defects, maternal serum AFP after 15 weeks is recommended    We discussed the following diagnostic options:    confirmatory testing  Genetic testing on either cord blood immediately following delivery or on peripheral blood in the  course  The couple would meet with pediatric genetics providers to coordinate testing and discuss results  If a diagnosis is made, the child would follow with pediatric genetics         It was a pleasure to be involved with Yolanda mo care. Time spent on the day of encounter was 45 minutes.    Carmela Ann MS, [facility]  Maternal Fetal Medicine  Office: [phone]  [facility]: [phone]   Fax: [phone]  [facility]

## 2025-03-26 NOTE — PROGRESS NOTES
NPN nurse visit done over the phone. Pt will be given NPN folder and book at her upcoming appt.  Discussed optional screening available to assess chromosomal anomalies. Questions answered. Informed pt of the clinic structure (on-call does deliveries for the day, may be male or female doctor). Pt advised to call the clinic if she has any questions or concerns related to her pregnancy. Prenatal labs will be obtained at her upcoming appt. New prenatal visit scheduled on 3/28 with Dr Doyle.    34w3d      Medications stopped upon pos HPT: none    Last pap: 6/14/23    PETRONA from [facility] records in [facility].  Has GDM, added Diab Ed referral. Per pt, BG levels have been good  Sees MFM, recently did Myriad testing with MFM due to mild rhizomelic shortening of the long bones on ultrasound.  [facility] recommends IOL at 39w due to AMA    Patient supplied answers from flow sheet for:  Prenatal OB Questionnaire.  Past Medical History  Have you ever recieved care for your mental health? : No  Have you ever been in a major accident or suffered serious trauma?: No  Within the last year, has anyone hit, slapped, kicked or otherwise hurt you?: No  In the last year, has anyone forced you to have sex when you didn't want to?: No    Past Medical History 2   Have you ever received a blood transfusion?: No  Would you accept a blood transfusion if was medically recommended?: Yes  Does anyone in your home smoke?: No   Is your blood type Rh negative?: No  Have you ever ?: (!) Yes  Have you been hospitalized for a nonsurgical reason excluding normal delivery?: No  Have you ever had an abnormal pap smear?: No    Past Medical History (Continued)  Do you have a history of abnormalities of the uterus?: No  Did your mother take LUIS F or any other hormones when she was pregnant with you?: No  Do you have any other problems we have not asked about which you feel may be important to this pregnancy?: No

## 2025-04-01 ENCOUNTER — VIRTUAL VISIT (OUTPATIENT)
Dept: EDUCATION SERVICES | Facility: CLINIC | Age: 45
End: 2025-04-01
Attending: OBSTETRICS & GYNECOLOGY
Payer: COMMERCIAL

## 2025-04-01 ENCOUNTER — TELEPHONE (OUTPATIENT)
Dept: MATERNAL FETAL MEDICINE | Facility: CLINIC | Age: 45
End: 2025-04-01

## 2025-04-01 DIAGNOSIS — O09.523 MULTIGRAVIDA OF ADVANCED MATERNAL AGE IN THIRD TRIMESTER: Primary | ICD-10-CM

## 2025-04-01 DIAGNOSIS — O24.419 GESTATIONAL DIABETES MELLITUS, ANTEPARTUM: ICD-10-CM

## 2025-04-01 DIAGNOSIS — O35.10X0 MATERNAL CARE FOR SUSPECTED CHROMOSOMAL ABNORMALITY IN FETUS: Primary | ICD-10-CM

## 2025-04-01 LAB — SCANNED LAB RESULT: ABNORMAL

## 2025-04-01 PROCEDURE — G0108 DIAB MANAGE TRN  PER INDIV: HCPCS | Mod: 95 | Performed by: DIETITIAN, REGISTERED

## 2025-04-01 NOTE — LETTER
4/1/2025         RE: Yolanda Damian  [address]  [place] [address]        Dear Colleague,    Thank you for referring your patient, Yolanda Damian, to the [facility]. Please see a copy of my visit note below.    Gestational Diabetes Self-Management Education & Support  Type of service:  Video Visit    If the video visit is dropped, the video visit invitation should be resent by: Text to cell phone: [phone]  Originating Location (pt. Location): Home  Distant Location (provider location): Offsite  Mode of Communication:  Video Conference via [employer] Start Time:  108  - she was unable to join video visit  Video End Time (time video stopped): 158    Assessment  Yolanda has been checking her glucose as below and has a pattern of elevated fasting glucose and 1 hour post breakfast. She is not having an HS snack at all and breakfast does not have much protein. Discussed options for snacks and breakfast including protein and carbohydrate at that time to see if that will lower glucose. She is 35 weeks and 2 days, discussed need for follow up visit on Thursday, in person, for insulin start.     Blood Glucose/Ketone Log:    Date Ketones Fasting Post Breakfast Post Lunch Post Supper   4/1  96 169     3/31  97 153 115 113   3/30  97 159 138 97   3/29  99 153 113 129   3/28  99 82 125 136   3/27  96 137 158 144   3/26  100 143 106 107       Educational topics covered today:  GDM diagnosis, pathophysiology, risks and complications of GDM, means of controlling GDM, using a blood glucose monitor, blood glucose goals, logging and interpreting glucose results, when to call a diabetes educator or OB provider, healthy eating during pregnancy, meal planning for GDM, and physical activity    Plan  Check glucose 4 times daily, before breakfast and 1 hour after each meal.   Check Ketones daily for one week, if negative, reduce testing to once a week. Time  "did not allow to review at this visit, please do so at follow up.  Meal plan: Follow consistent carbohydrate meal plan, eat carbohydrates and protein/fat at all meals/snacks.     Call/MyChart message diabetes educator if 3 or more blood sugars are above the goal in 1 week, if ketones are positive, or with questions/concerns.    Follow-up:    Follow up on Upcoming Diabetes Ed Appointments     Visit Type Date Time Department    GDM ED 2025  1:00 PM [facility]    DIABETES ED 4/3/2025  2:00 PM [facility]    GDM FOLLOW UP 2025  1:00 PM [facility]        Subjective/Objective  Yolanda is an 44 year old year old, presenting for the following diabetes education related to:      Accompanied by: Self  Gestational weeks: 35 weeks 2 days  Hospital planned for delivery: [facility] OB Visit Date: 25  Number of previous pregnancies: 1  Had any babies over 9 lbs: No  Previously had Gestational Diabetes: No    Cultural Influences/Ethnic Background:   or     Estimated Date of Delivery: May 4, 2025    1 hour OGTT  No results found for: \"GLU1\"      3 hour OGTT    Fasting  No results found for: \"GTTGF\"    1 hour  No results found for: \"GTTG1\"    2 hour  No results found for: \"GTTG2\"    3 hour  No results found for: \"GTTG3\"    Healthy Eating:  Pre-pregnancy weight (lbs): 156  Exercise:: Currently not exercising  Meal planning/habits: None  How many times a week on average do you eat food made away from home (restaurant/take-out)?: 1  Meals include: Breakfast, Lunch, Dinner  Breakfast: 10:00 AM: yogurt with granola, milk OR regular jello  Lunch: 12:30: beans or lentils with steak, aqua  Dinner: 6:30-7:00: cereal or yogurt or milk  Snacks: AM: no PM: milk or yogurt HS: no  Other: Does not drink sweetened beverages  Beverages: Water  Pre-minerva vitamin?: Yes  Supplements?: No  Experiencing nausea?: No  Experiencing heartburn?: No    Healthy Coping:  Emotional response to " diabetes: Ready to learn  Stage of change: PREPARATION (Decided to change - considering how)    Current Management:  Taking medications for gestational diabetes?: No  Difficulty affording diabetes medication?: No  Difficulty affording diabetes testing supplies?: No    Catalina Hickey RDN, [place], [facility]  Time Spent: 50 minutes  Encounter Type: Individual     Any diabetes medication dose changes were made via the [facility] Standing Orders under the patient's referring provider.

## 2025-04-01 NOTE — TELEPHONE ENCOUNTER
2025     I called Yolanda to discuss her NIPT results. Call was completed with [facility] interpreter, Shannan.    Results indicate NO ANEUPLOIDY DETECTED for chromosomes 21, 18, or 13. This puts her current pregnancy at low risk for Down syndrome, trisomy 18, and trisomy 13. This test is reported to have the following sensitivities: Down syndrome- 99.7%, trisomy 18- 97.9%, and trisomy 13- 99.0%. Although these results are reassuring, this does not replace a standard chromosome analysis.     However, Yolanda's result was consistent with a pregnancy at an increased risk for a sex chromosome aneuploidy, XXY, also called KLINEFELTER SYNDROME.     We discussed that this NIPT result does not confer a diagnosis of Klinefelter syndrome and that this could be a false positive result. We reviewed the recommendation for  testing to confirm or rule out a diagnosis of Klinefelter in Yolanda's son. A referral to the pediatric genetics team was placed and their  was notified. Yolanda knows to expect a call to get her son scheduled for testing postnatally.      We briefly discussed the more common features of Klinefelter syndrome. The specific features can be quite variable from one affected male to another, making it difficult to predict exactly what symptoms of the syndrome an individual may have. We discussed the following more common features of Klinefelter syndrome:   The most consistent feature of this condition is underdeveloped testes that do not produce normal levels of testosterone. Boys with decreased testosterone can show some differences in physical development during adolescence/adulthood, including less facial/body hair, some breast development, and less muscle mass.  However, we discussed that when boys are identified early with 47,XXY, supplemental testosterone can be given that may aid in the development of male secondary sexual characteristics.  Most men with Klinefelter  syndrome are infertile.  However, some men with this condition do make some sperm and may be able to conceive children with assisted reproductive technologies.   We reviewed that at birth, babies with Klinefelter syndrome are not generally expected to look different than other boys.  Occasionally, babies with Klinefelter syndrome have undescended testes, hypospadias, or a smaller than average penis. Later in development physical features like being taller or having flat feet may be noticed.   Some (but not all) boys with Klinefelter syndrome have some delays in their development. Some boys have hypotonia, which can be associated with mild delays in gross motor skills and coordination. Some boys will have speech delay and other speech-related disabilities, including mild autism.  We discussed that the vast majority of boys/men with Klinefelter have normal intellegence, but some do have IQ scores slightly lower than their siblings, and some boys do have some learning issues that might require additional help in school.    Some particular personality/behavior characteristics have also been reported to be more common in boys with Klinefelter syndrome, including: shyness, difficulty with social skills, ADHD, and depression/anxiety.  Again, we reviewed that not all boys/men with Klinefelter syndrome will have these characteristics.    We talked about that many of the above-described learning and behavior/personality characteristics are also common in boys that do not have Klinefelter syndrome.  We reviewed that for many boys/men, these characteristics and physical characteristics are so mild/subtle or not present that some individuals with Klinefelter syndrome are not diagnosed with this condition until they experience infertility as an adult.  We talked about that there have been studies done that suggest that an individual's home environment and social support in childhood may play a more significant role in these  issues than an individual's sex chromosome complement.      Carmela Ann MS, [facility]  Maternal Fetal Medicine  Office: [phone]  [facility]: [phone]   Fax: [phone]  [facility]

## 2025-04-01 NOTE — Clinical Note
4/1/2025         RE: Yolanda Damian  [address]  [facility] [address]        Dear Colleague,    Thank you for referring your patient, Yolanda Dmaian, to the [facility]. Please see a copy of my visit note below.    Gestational Diabetes Self-Management Education & Support  Type of service:  Video Visit    If the video visit is dropped, the video visit invitation should be resent by: Text to cell phone: [phone]  Originating Location (pt. Location): Home  Distant Location (provider location): Offsite  Mode of Communication:  Video Conference via [employer] Start Time:  108  - she was unable to join video visit  Video End Time (time video stopped): 158    Assessment  Yolanda has been checking her glucose as below and has a pattern of elevated fasting glucose and 1 hour post breakfast. She is not having an HS snack at all and breakfast does not have much protein. Discussed options for snacks and breakfast including protein and carbohydrate at that time to see if that will lower glucose. She is 35 weeks and 2 days, discussed need for follow up visit on Thursday, in person, for insulin start.     Blood Glucose/Ketone Log:    Date Ketones Fasting Post Breakfast Post Lunch Post Supper   4/1  96 169     3/31  97 153 115 113   3/30  97 159 138 97   3/29  99 153 113 129   3/28  99 82 125 136   3/27  96 137 158 144   3/26  100 143 106 107       Educational topics covered today:  GDM diagnosis, pathophysiology, risks and complications of GDM, means of controlling GDM, using a blood glucose monitor, blood glucose goals, logging and interpreting glucose results, when to call a diabetes educator or OB provider, healthy eating during pregnancy, meal planning for GDM, and physical activity    Plan  Check glucose 4 times daily, before breakfast and 1 hour after each meal.   Check Ketones daily for one week, if negative, reduce testing to once a week. Time  "did not allow to review at this visit, please do so at follow up.  Meal plan: Follow consistent carbohydrate meal plan, eat carbohydrates and protein/fat at all meals/snacks.     Call/MyChart message diabetes educator if 3 or more blood sugars are above the goal in 1 week, if ketones are positive, or with questions/concerns.    Follow-up:    Follow up on Upcoming Diabetes Ed Appointments     Visit Type Date Time Department    GDM ED 2025  1:00 PM [facility]    DIABETES ED 4/3/2025  2:00 PM [facility] DI[facility]ET[facility] ED    GDM FOLLOW UP 2025  1:00 PM [facility]        Subjective/Objective  Yolanda is an 44 year old year old, presenting for the following diabetes education related to:      Accompanied by: Self  Gestational weeks: 35 weeks 2 days  Hospital planned for delivery: [facility] OB Visit Date: 25  Number of previous pregnancies: 1  Had any babies over 9 lbs: No  Previously had Gestational Diabetes: No    Cultural Influences/Ethnic Background:   or     Estimated Date of Delivery: May 4, 2025    1 hour OGTT  No results found for: \"GLU1\"      3 hour OGTT    Fasting  No results found for: \"GTTGF\"    1 hour  No results found for: \"GTTG1\"    2 hour  No results found for: \"GTTG2\"    3 hour  No results found for: \"GTTG3\"    Healthy Eating:  Pre-pregnancy weight (lbs): 156  Exercise:: Currently not exercising  Meal planning/habits: None  How many times a week on average do you eat food made away from home (restaurant/take-out)?: 1  Meals include: Breakfast, Lunch, Dinner  Breakfast: 10:00 AM: yogurt with granola, milk OR regular jello  Lunch: 12:30: beans or lentils with steak, aqua  Dinner: 6:30-7:00: cereal or yogurt or milk  Snacks: AM: no PM: milk or yogurt HS: no  Other: Does not drink sweetened beverages  Beverages: Water  Pre-minerva vitamin?: Yes  Supplements?: No  Experiencing nausea?: No  Experiencing heartburn?: No    Healthy Coping:  Emotional response to " diabetes: Ready to learn  Stage of change: PREPARATION (Decided to change - considering how)    Current Management:  Taking medications for gestational diabetes?: No  Difficulty affording diabetes medication?: No  Difficulty affording diabetes testing supplies?: No    Catalina Hickey RDN, [place], [facility]  Time Spent: 50 minutes  Encounter Type: Individual     Any diabetes medication dose changes were made via the [facility] Standing Orders under the patient's referring provider.

## 2025-04-01 NOTE — PROGRESS NOTES
Gestational Diabetes Self-Management Education & Support  Type of service:  Video Visit    If the video visit is dropped, the video visit invitation should be resent by: Text to cell phone: [phone]  Originating Location (pt. Location): Home  Distant Location (provider location): Offsite  Mode of Communication:  Video Conference via [employer]    Video Start Time:  108  - she was unable to join video visit  Video End Time (time video stopped): 158    Assessment  Yolanda has been checking her glucose as below and has a pattern of elevated fasting glucose and 1 hour post breakfast. She is not having an HS snack at all and breakfast does not have much protein. Discussed options for snacks and breakfast including protein and carbohydrate at that time to see if that will lower glucose. She is 35 weeks and 2 days, discussed need for follow up visit on Thursday, in person, for insulin start.     Blood Glucose/Ketone Log:    Date Ketones Fasting Post Breakfast Post Lunch Post Supper   4/1  96 169     3/31  97 153 115 113   3/30  97 159 138 97   3/29  99 153 113 129   3/28  99 82 125 136   3/27  96 137 158 144   3/26  100 143 106 107       Educational topics covered today:  GDM diagnosis, pathophysiology, risks and complications of GDM, means of controlling GDM, using a blood glucose monitor, blood glucose goals, logging and interpreting glucose results, when to call a diabetes educator or OB provider, healthy eating during pregnancy, meal planning for GDM, and physical activity    Plan  Check glucose 4 times daily, before breakfast and 1 hour after each meal.   Check Ketones daily for one week, if negative, reduce testing to once a week. Time did not allow to review at this visit, please do so at follow up.  Meal plan: Follow consistent carbohydrate meal plan, eat carbohydrates and protein/fat at all meals/snacks.     Call/MyChart message diabetes educator if 3 or more blood sugars are above the goal in 1 week, if  "ketones are positive, or with questions/concerns.    Follow-up:    Follow up on Upcoming Diabetes Ed Appointments     Visit Type Date Time Department    GDM ED 2025  1:00 PM [facility]    DIABETES ED 4/3/2025  2:00 PM [facility] DIABETES ED    GDM FOLLOW UP 2025  1:00 PM [facility]        Subjective/Objective  Yolanda is an 44 year old year old, presenting for the following diabetes education related to:      Accompanied by: Self  Gestational weeks: 35 weeks 2 days  Hospital planned for delivery: [facility] OB Visit Date: 25  Number of previous pregnancies: 1  Had any babies over 9 lbs: No  Previously had Gestational Diabetes: No    Cultural Influences/Ethnic Background:   or     Estimated Date of Delivery: May 4, 2025    1 hour OGTT  No results found for: \"GLU1\"      3 hour OGTT    Fasting  No results found for: \"GTTGF\"    1 hour  No results found for: \"GTTG1\"    2 hour  No results found for: \"GTTG2\"    3 hour  No results found for: \"GTTG3\"    Healthy Eating:  Pre-pregnancy weight (lbs): 156  Exercise:: Currently not exercising  Meal planning/habits: None  How many times a week on average do you eat food made away from home (restaurant/take-out)?: 1  Meals include: Breakfast, Lunch, Dinner  Breakfast: 10:00 AM: yogurt with granola, milk OR regular jello  Lunch: 12:30: beans or lentils with steak, aqua  Dinner: 6:30-7:00: cereal or yogurt or milk  Snacks: AM: no PM: milk or yogurt HS: no  Other: Does not drink sweetened beverages  Beverages: Water  Pre- vitamin?: Yes  Supplements?: No  Experiencing nausea?: No  Experiencing heartburn?: No    Healthy Coping:  Emotional response to diabetes: Ready to learn  Stage of change: PREPARATION (Decided to change - considering how)    Current Management:  Taking medications for gestational diabetes?: No  Difficulty affording diabetes medication?: No  Difficulty affording diabetes testing supplies?: No    Catalina Hickey" HERNAN, [place], [facility]  Time Spent: 50 minutes  Encounter Type: Individual     Any diabetes medication dose changes were made via the [facility] Standing Orders under the patient's referring provider.

## 2025-04-03 ENCOUNTER — VIRTUAL VISIT (OUTPATIENT)
Dept: EDUCATION SERVICES | Facility: CLINIC | Age: 45
End: 2025-04-03
Payer: COMMERCIAL

## 2025-04-03 DIAGNOSIS — O24.414 INSULIN CONTROLLED GESTATIONAL DIABETES MELLITUS (GDM) DURING PREGNANCY, ANTEPARTUM: Primary | ICD-10-CM

## 2025-04-03 RX ORDER — BLOOD SUGAR DIAGNOSTIC
STRIP MISCELLANEOUS
Qty: 100 EACH | Refills: 1 | Status: SHIPPED | OUTPATIENT
Start: 2025-04-03

## 2025-04-03 RX ORDER — URINE ACETONE TEST STRIPS
STRIP MISCELLANEOUS
Qty: 50 STRIP | Refills: 1 | Status: SHIPPED | OUTPATIENT
Start: 2025-04-03

## 2025-04-03 NOTE — LETTER
4/3/2025         RE: Yolanda Damian  [address]  [place] [address]        Dear Colleague,    Thank you for referring your patient, Yolanda Damian, to the [facility]. Please see a copy of my visit note below.    Diabetes Self-Management Education & Support    Type of Service: Telephone Visit    Originating Location (Patient Location): Home  Distant Location (Provider Location): [facility]  Mode of Communication:  Telephone    Telephone Visit Start Time:  2:18pm  Telephone Visit End Time (telephone visit stop time): 3:18pm    How would patient like to obtain AVS? Not wanted.      Assessment  Ketones: Not testing yet.   Fasting blood glucoses: 33% in target.  After breakfast: 0% in target.  After lunch: 100% in target.  After dinner: 100% in target.    I was supposed to meet with Yolanda in person today for education on starting insulin.  At some point the appointment was changed to a video visit, but once again patient was not able to connect on the video visit.  I called patient with a .  I gathered her blood glucose data from the last 2 days.  Her fastings remain mostly elevated as well as her after breakfast blood sugars.  She has added in a bedtime snack and said it might be a salad or crackers.  1 morning for breakfast she had a strawberry banana smoothie and the next morning she had yogurt with granola.  I reviewed with her that I would like her to avoid fruit at breakfast including in the smoothie.  I recommended she start insulin today for the fasting blood glucose levels.  She will likely need insulin for her after breakfast blood sugars as well, but we will just start with NPH at bedtime today.  I reviewed all of the insulin instructions over the phone and she stated understanding.  I did let her know that she can also ask at the pharmacy when she picks it up to have the pharmacist go through how to give  the injection with her.  I asked her to contact us if she has any trouble getting the insulin and did let her know that if she runs into any issues giving the injection she should bring the insulin and the syringe with her to her follow-up appointment next week with Catalina.  I went over the education for urine ketone testing and she plans to start this as well.  I think she will need more help with the GDM meal plan as well.  I did ask if she had an email address so that I could send her the insulin instructions, but she said she does not have an email address.  She is planning to get the GDM education materials when she sees Catalina in person next week.    Intervention  Educational topics covered today:  Taking Insulin:  1. Take NPH (Humulin-N or Novolin-N) - 10 units at bedtime.     - Rotate injection sites, keeping at least 1 inch apart from last injection site and 2 inches away from belly button or surgical scars.    -If you have a cloudy insulin, mix the insulin gently by rolling between your hands 10 times and turning upside down and right side up 10 times. Do not shake.     2. Vial and Syringe - use a new syringe for each injection. Do not reuse syringes. Draw air into the syringe in the amount equal to the insulin dose you are going to take. Put needle into the vial and inject the air into the vial before turning upside down to draw insulin into the syringe.      3. Store insulin you are not using in the refrigerator (do not freeze). Take new insulin out of the refrigerator a few hours prior to use to bring to room temperature.     4. Once opened NPH Vial - Novolin N can be kept at room temperature for 42 days.. Do not use the opened insulin after this time has passed, even if there is still medicine inside.     5. Always carry your blood sugar meter and a sugar source like glucose tablets with you in case of a low blood sugar. Treat a low blood sugar (less than 70) with 15 grams of carbohydrate (1 carb  choice). Wait 15 minutes, recheck blood sugar. If blood sugar is still below 70, repeat the treatment.    6. Call your doctor or diabetes educator if you begin having low blood sugars or if you have questions or concerns.     -Ketone testing every morning.    Educational Materials provided today:  Patient verbalized understanding of diabetes self-management education concepts discussed, opportunities for ongoing education and support, and recommendations provided today    Plan  Check glucose four times daily, before breakfast and 1 hour after each meal.  Check ketones once a week when readings are consistently negative.  Continue with recommended physical activity.  Continue to follow recommended meal plan: 30-45g carbohydratess at breakfast, 45-60g carbohydratess at lunch, 45-60g carbohydrates at supper, 15-30g carbohydrates at snacks.  Follow consistent carbohydrate meal plan, eat carbohydrates and protein/fat at all meals/snacks.    If 3 or more blood sugars are above the goal at a given time, or if Ketones are small, moderate or large, call or MyChart message the diabetes educator.    Follow-up:    Follow up on Upcoming Diabetes Ed Appointments     Visit Type Date Time Department    GDM FOLLOW UP 4/3/2025  2:00 PM  DIABETES ED    GDM FOLLOW UP 4/8/2025  1:00 PM [facility] ED CLINIC        Subjective/Objective  Yolanda is an 44 year old year old, presenting for the following diabetes education related to: Follow-up    Accompanied by: Self  Diabetes management related comments/concerns: Insulin start  Gestational weeks: 35 weeks 2 days  Next OB Visit Date: 04/04/25  Number of previous pregnancies: 1  Had any babies over 9 lbs: No  Previously had Gestational Diabetes: No    Cultural Influences/Ethnic Background:   or       LMP 08/06/2024     Current weight 170 lbs    Estimated Date of Delivery: May 4, 2025    Blood Glucose/Ketone Log:   Date  Ketones FBG 1 hours post Breakfast 1 hour post lunch    1  hours post dinner     96 169 121 118     94 158 128 110   /3  98 152 134      Healthy Eating:  Pre-pregnancy weight (lbs): 156  Exercise:: Currently not exercising  Meal planning/habits: None  How many times a week on average do you eat food made away from home (restaurant/take-out)?: 1  Meals include: Breakfast, Lunch, Dinner  Breakfast: 10:00 AM: yogurt with granola, milk OR regular jello  Lunch: 12:30: beans or lentils with steak, aqua  Dinner: 6:30-7:00: cereal or yogurt or milk  Snacks: AM: no PM: milk or yogurt HS: no  Other: Does not drink sweetened beverages  Beverages: Water  Pre- vitamin?: Yes  Supplements?: No  Experiencing nausea?: No  Experiencing heartburn?: No    Healthy Coping:  Emotional response to diabetes: Ready to learn, Concern for health and well-being  Stage of change: ACTION (Actively working towards change)    Current Management:  Taking medications for gestational diabetes?: No  Difficulty affording diabetes medication?: No  Difficulty affording diabetes testing supplies?: No    Mary Duran RN  Time Spent: 60 minutes  Encounter Type: Individual     Diabetes medication dose changes were made via the [facility] Standing Orders under the patient's referring provider.

## 2025-04-03 NOTE — PROGRESS NOTES
Diabetes Self-Management Education & Support    Type of Service: Telephone Visit    Originating Location (Patient Location): Home  Distant Location (Provider Location): [facility]  Mode of Communication:  Telephone    Telephone Visit Start Time:  2:18pm  Telephone Visit End Time (telephone visit stop time): 3:18pm    How would patient like to obtain AVS? Not wanted.      Assessment  Ketones: Not testing yet.   Fasting blood glucoses: 33% in target.  After breakfast: 0% in target.  After lunch: 100% in target.  After dinner: 100% in target.    I was supposed to meet with Yolanda in person today for education on starting insulin.  At some point the appointment was changed to a video visit, but once again patient was not able to connect on the video visit.  I called patient with a .  I gathered her blood glucose data from the last 2 days.  Her fastings remain mostly elevated as well as her after breakfast blood sugars.  She has added in a bedtime snack and said it might be a salad or crackers.  1 morning for breakfast she had a strawberry banana smoothie and the next morning she had yogurt with granola.  I reviewed with her that I would like her to avoid fruit at breakfast including in the smoothie.  I recommended she start insulin today for the fasting blood glucose levels.  She will likely need insulin for her after breakfast blood sugars as well, but we will just start with NPH at bedtime today.  I reviewed all of the insulin instructions over the phone and she stated understanding.  I did let her know that she can also ask at the pharmacy when she picks it up to have the pharmacist go through how to give the injection with her.  I asked her to contact us if she has any trouble getting the insulin and did let her know that if she runs into any issues giving the injection she should bring the insulin and the syringe with her to her follow-up appointment next week with Catalina.  I  went over the education for urine ketone testing and she plans to start this as well.  I think she will need more help with the GDM meal plan as well.  I did ask if she had an email address so that I could send her the insulin instructions, but she said she does not have an email address.  She is planning to get the GDM education materials when she sees Catalina in person next week.    Intervention  Educational topics covered today:  Taking Insulin:  1. Take NPH (Humulin-N or Novolin-N) - 10 units at bedtime.     - Rotate injection sites, keeping at least 1 inch apart from last injection site and 2 inches away from belly button or surgical scars.    -If you have a cloudy insulin, mix the insulin gently by rolling between your hands 10 times and turning upside down and right side up 10 times. Do not shake.     2. Vial and Syringe - use a new syringe for each injection. Do not reuse syringes. Draw air into the syringe in the amount equal to the insulin dose you are going to take. Put needle into the vial and inject the air into the vial before turning upside down to draw insulin into the syringe.      3. Store insulin you are not using in the refrigerator (do not freeze). Take new insulin out of the refrigerator a few hours prior to use to bring to room temperature.     4. Once opened NPH Vial - Novolin N can be kept at room temperature for 42 days.. Do not use the opened insulin after this time has passed, even if there is still medicine inside.     5. Always carry your blood sugar meter and a sugar source like glucose tablets with you in case of a low blood sugar. Treat a low blood sugar (less than 70) with 15 grams of carbohydrate (1 carb choice). Wait 15 minutes, recheck blood sugar. If blood sugar is still below 70, repeat the treatment.    6. Call your doctor or diabetes educator if you begin having low blood sugars or if you have questions or concerns.     -Ketone testing every morning.    Educational Materials  provided today:  Patient verbalized understanding of diabetes self-management education concepts discussed, opportunities for ongoing education and support, and recommendations provided today    Plan  Check glucose four times daily, before breakfast and 1 hour after each meal.  Check ketones once a week when readings are consistently negative.  Continue with recommended physical activity.  Continue to follow recommended meal plan: 30-45g carbohydratess at breakfast, 45-60g carbohydratess at lunch, 45-60g carbohydrates at supper, 15-30g carbohydrates at snacks.  Follow consistent carbohydrate meal plan, eat carbohydrates and protein/fat at all meals/snacks.    If 3 or more blood sugars are above the goal at a given time, or if Ketones are small, moderate or large, call or MyChart message the diabetes educator.    Follow-up:    Follow up on Upcoming Diabetes Ed Appointments     Visit Type Date Time Department    GDM FOLLOW UP 4/3/2025  2:00 PM [facility] DIABETES ED    GDM FOLLOW UP 4/8/2025  1:00 PM [facility]ET[facility] ED CLINIC        Subjective/Objective  Yolanda is an 44 year old year old, presenting for the following diabetes education related to: Follow-up    Accompanied by: Self  Diabetes management related comments/concerns: Insulin start  Gestational weeks: 35 weeks 2 days  Next OB Visit Date: 04/04/25  Number of previous pregnancies: 1  Had any babies over 9 lbs: No  Previously had Gestational Diabetes: No    Cultural Influences/Ethnic Background:   or       LMP 08/06/2024     Current weight 170 lbs    Estimated Date of Delivery: May 4, 2025    Blood Glucose/Ketone Log:   Date  Ketones FBG 1 hours post Breakfast 1 hour post lunch    1 hours post dinner   4/1  96 169 121 118   4/2  94 158 128 110   4/3  98 152 134      Healthy Eating:  Pre-pregnancy weight (lbs): 156  Exercise:: Currently not exercising  Meal planning/habits: None  How many times a week on average do you eat food made away from home  (restaurant/take-out)?: 1  Meals include: Breakfast, Lunch, Dinner  Breakfast: 10:00 AM: yogurt with granola, milk OR regular jello  Lunch: 12:30: beans or lentils with steak, aqua  Dinner: 6:30-7:00: cereal or yogurt or milk  Snacks: AM: no PM: milk or yogurt HS: no  Other: Does not drink sweetened beverages  Beverages: Water  Pre-minerva vitamin?: Yes  Supplements?: No  Experiencing nausea?: No  Experiencing heartburn?: No    Healthy Coping:  Emotional response to diabetes: Ready to learn, Concern for health and well-being  Stage of change: ACTION (Actively working towards change)    Current Management:  Taking medications for gestational diabetes?: No  Difficulty affording diabetes medication?: No  Difficulty affording diabetes testing supplies?: No    Mary Duran RN  Time Spent: 60 minutes  Encounter Type: Individual     Diabetes medication dose changes were made via the [facility] Standing Orders under the patient's referring provider.

## 2025-04-03 NOTE — PATIENT INSTRUCTIONS
Avoid fruit at beakfast.    Start 10 units of NPH insulin at bedtime (vial/syringe).    Start checking urine ketones every morning.    In-person follow-up planned with Catalina on 4/8/25.

## 2025-04-08 ENCOUNTER — HOSPITAL ENCOUNTER (OUTPATIENT)
Dept: ULTRASOUND IMAGING | Facility: CLINIC | Age: 45
Discharge: HOME | End: 2025-04-08
Attending: OBSTETRICS & GYNECOLOGY
Payer: COMMERCIAL

## 2025-04-08 ENCOUNTER — OFFICE VISIT (OUTPATIENT)
Dept: MATERNAL FETAL MEDICINE | Facility: CLINIC | Age: 45
End: 2025-04-08
Attending: OBSTETRICS & GYNECOLOGY
Payer: COMMERCIAL

## 2025-04-08 ENCOUNTER — ALLIED HEALTH/NURSE VISIT (OUTPATIENT)
Dept: EDUCATION SERVICES | Facility: CLINIC | Age: 45
End: 2025-04-08
Payer: COMMERCIAL

## 2025-04-08 DIAGNOSIS — O09.523 MULTIGRAVIDA OF ADVANCED MATERNAL AGE IN THIRD TRIMESTER: Primary | ICD-10-CM

## 2025-04-08 DIAGNOSIS — O24.414 INSULIN CONTROLLED GESTATIONAL DIABETES MELLITUS (GDM) DURING PREGNANCY, ANTEPARTUM: Primary | ICD-10-CM

## 2025-04-08 DIAGNOSIS — O36.5930 MATERNAL CARE FOR OTHER KNOWN OR SUSPECTED POOR FETAL GROWTH, THIRD TRIMESTER, NOT APPLICABLE OR UNSPECIFIED: ICD-10-CM

## 2025-04-08 DIAGNOSIS — O09.523 MULTIGRAVIDA OF ADVANCED MATERNAL AGE IN THIRD TRIMESTER: ICD-10-CM

## 2025-04-08 PROCEDURE — G0108 DIAB MANAGE TRN  PER INDIV: HCPCS | Performed by: DIETITIAN, REGISTERED

## 2025-04-08 PROCEDURE — 76819 FETAL BIOPHYS PROFIL W/O NST: CPT

## 2025-04-08 NOTE — Clinical Note
4/8/2025         RE: Yolanda Damian  [address]  [facility] [address]        Dear Colleague,    Thank you for referring your patient, Yolanda Damian, to the [facility]. Please see a copy of my visit note below.    .gdmb    Gestational Diabetes Self-Management Education & Support  Type of Service: In Person Visit    Assessment  Ketones: Negative.   Fasting blood glucoses: 33% in target.  After breakfast: 100% in target.  After lunch: 100% in target.  After dinner: 100% in target.    Yolanda reports she is no longer having shakes for breakfast. She is having yogurt with granola or toast with peanut butter and fasting glucose in target since making these changes and also starting NPH insulin. She reports doing Ok with insulin injections and has no questions today.  Reviewed technique as well.      Intervention  Educational topics covered today:  What to expect after delivery, future testing for Type 2 diabetes (2 hour OGTT at 6 week post-partum check-up and annual fasting blood glucose level), risk of GDM and planning ahead for future pregnancies, recommended lifestyle interventions for reducing the risk of Type 2 Diabetes, when to call a Diabetes Educator or OB provider    Educational Materials provided today:  [facility] Diabetes    Patient verbalized understanding of diabetes self-management education concepts discussed, opportunities for ongoing education and support, and recommendations provided today    Plan  Increase NPH insulin to 12 units tonight.  Check glucose four times daily, before breakfast and 1 hour after each meal.  Check ketones once a week when readings are consistently negative.  Continue with recommended physical activity.  Continue to follow consistent carbohydrate meal plan, eat carbohydrates and protein/fat at all meals/snacks.    If 3 or more blood sugars are above the goal at a given time, or if Ketones are small,  moderate or large, call or MyChart message the diabetes educator.  All reminders translated to Norwegian and printed the AVS for her today.     Follow-up:    Follow up on Upcoming Diabetes Ed Appointments     Visit Type Date Time Department    GDM FOLLOW UP 2025  1:00 PM [facility]        Subjective/Objective  Yolanda is an 44 year old year old, presenting for the following diabetes education related to:      Accompanied by: Self, Spouse, Son  Diabetes management related comments/concerns: Insulin review  Gestational weeks: 36 weeks 2 days  Next OB Visit Date: 25  Number of previous pregnancies: 1  Had any babies over 9 lbs: No  Previously had Gestational Diabetes: No    Cultural Influences/Ethnic Background:   or     LMP 2024       Estimated Date of Delivery: May 4, 2025    Blood Glucose/Ketone Log:     Date Ketones Fasting Post Breakfast Post Lunch Post Supper    neg 98 97  Increase to 12 units    neg 92 94 83 125    neg 97 71 114 96     91 162 81 102- started 10 units NPH     98 119 116 115   4/3  98 152 134 118     94 158 122 110       Healthy Eating:  Pre-pregnancy weight (lbs): 156  Exercise:: Currently not exercising  Meal planning/habits: None  How many times a week on average do you eat food made away from home (restaurant/take-out)?: 1  Meals include: Breakfast, Lunch, Dinner  Breakfast: 10:00 AM: yogurt with granola, milk OR regular jello  Lunch: 12:30: beans or lentils with steak, aqua  Dinner: 6:30-7:00: cereal or yogurt or milk  Snacks: AM: no PM: milk or yogurt HS: no  Other: Does not drink sweetened beverages  Beverages: Water  Pre- vitamin?: Yes  Supplements?: No  Experiencing nausea?: No  Experiencing heartburn?: No    Healthy Coping:  Emotional response to diabetes: Ready to learn, Concern for health and well-being  Informal Support system:: Family  Stage of change: ACTION (Actively working towards change)    Current Management:  Taking  medications for gestational diabetes?: No  Difficulty affording diabetes medication?: No  Difficulty affording diabetes testing supplies?: No    Catalina Hickey RD  Time Spent: 30 minutes  Encounter Type: Individual     Diabetes medication dose changes were made via the [facility] Standing Orders under the patient's referring provider.

## 2025-04-08 NOTE — PROGRESS NOTES
Gestational Diabetes Self-Management Education & Support  Type of Service: In Person Visit    Assessment  Ketones: Negative.   Fasting blood glucoses: 33% in target.  After breakfast: 100% in target.  After lunch: 100% in target.  After dinner: 100% in target.    Yolanda reports she is no longer having shakes for breakfast. She is having yogurt with granola or toast with peanut butter and fasting glucose in target since making these changes and also starting NPH insulin. She reports doing Ok with insulin injections and has no questions today.  Reviewed technique as well.      Intervention  Educational topics covered today:  What to expect after delivery, future testing for Type 2 diabetes (2 hour OGTT at 6 week post-partum check-up and annual fasting blood glucose level), risk of GDM and planning ahead for future pregnancies, recommended lifestyle interventions for reducing the risk of Type 2 Diabetes, when to call a Diabetes Educator or OB provider    Educational Materials provided today:  [facility] Preventing Diabetes    Patient verbalized understanding of diabetes self-management education concepts discussed, opportunities for ongoing education and support, and recommendations provided today    Plan  Increase NPH insulin to 12 units tonight.  Check glucose four times daily, before breakfast and 1 hour after each meal.  Check ketones once a week when readings are consistently negative.  Continue with recommended physical activity.  Continue to follow consistent carbohydrate meal plan, eat carbohydrates and protein/fat at all meals/snacks.    If 3 or more blood sugars are above the goal at a given time, or if Ketones are small, moderate or large, call or MyChart message the diabetes educator.  All reminders translated to Trinidadian and printed the AVS for her today.     Follow-up:    Follow up on Upcoming Diabetes Ed Appointments     Visit Type Date Time Department    GDM FOLLOW UP 4/8/2025  1:00 PM [facility]        Subjective/Objective  Yolanda is an 44 year old year old, presenting for the following diabetes education related to:      Accompanied by: Self, Spouse, Son  Diabetes management related comments/concerns: Insulin review  Gestational weeks: 36 weeks 2 days  Next OB Visit Date: 25  Number of previous pregnancies: 1  Had any babies over 9 lbs: No  Previously had Gestational Diabetes: No    Cultural Influences/Ethnic Background:   or     LMP 2024       Estimated Date of Delivery: May 4, 2025    Blood Glucose/Ketone Log:     Date Ketones Fasting Post Breakfast Post Lunch Post Supper    neg 98 97  Increase to 12 units    neg 92 94 83 125    neg 97 71 114 96     91 162 81 102- started 10 units NPH     98 119 116 115   4/3  98 152 134 118     94 158 122 110       Healthy Eating:  Pre-pregnancy weight (lbs): 156  Exercise:: Currently not exercising  Meal planning/habits: None  How many times a week on average do you eat food made away from home (restaurant/take-out)?: 1  Meals include: Breakfast, Lunch, Dinner  Breakfast: 10:00 AM: yogurt with granola, milk OR regular jello  Lunch: 12:30: beans or lentils with steak, aqua  Dinner: 6:30-7:00: cereal or yogurt or milk  Snacks: AM: no PM: milk or yogurt HS: no  Other: Does not drink sweetened beverages  Beverages: Water  Pre- vitamin?: Yes  Supplements?: No  Experiencing nausea?: No  Experiencing heartburn?: No    Healthy Coping:  Emotional response to diabetes: Ready to learn, Concern for health and well-being  Informal Support system:: Family  Stage of change: ACTION (Actively working towards change)    Current Management:  Taking medications for gestational diabetes?: No  Difficulty affording diabetes medication?: No  Difficulty affording diabetes testing supplies?: No    Catalina Hickey RD  Time Spent: 30 minutes  Encounter Type: Individual     Diabetes medication dose changes were made via the [facility] Standing  Orders under the patient's referring provider.

## 2025-04-08 NOTE — PATIENT INSTRUCTIONS
Gumaro Guerrero,    Planifique compartir walton información de glucosa con el departamento de educación diabética olag vez a la semana, a menos que se indique lo contrario. (Plan to share your glucose information with the diabetes education department once a week, unless otherwise directed.)    1. Controle walton glucosa 4 veces al día, antes del desayuno y 1 hora después de cada comida, según lo recomendado.  Objetivo de glucosa en braxton antes del desayuno: <95 mg/dL  1 hora después del inicio de las comidas: <140 mg/dL  2 horas después del inicio de las comidas: <120 mg/dL    Call or MyChart message your diabetes educator if 3 or more blood sugars are above the goal in 1 week or if ketones are elevated (trace or above).     Llame o envíe un mensaje de MyChart a walton educador en diabetes si 3 o más niveles de azúcar en braxton superan el objetivo en 1 semana o si las cetonas están elevadas (trazas o superiores).    DESPUÉS DE LA ENTREGA:    The first weeks after delivery:   Check blood sugar 4 - 6 times per week to be sure that the numbers are in target after baby is born. Check blood sugar before breakfast or 2 hours after the start of a meal.     Las primeras semanas después del parto:  Revise walton nivel de azúcar en braxton de 4 a 6 veces por semana para asegurarse de que los niveles estén dentro del rango ideal después del nacimiento del bebé. Revise walton nivel de azúcar en braxton antes del desayuno o 2 horas después de comenzar olga comida.    Blood sugar goals are different when you are not pregnant:  Before breakfast: Less than 100  2 hours after a meal: Less than 140    Los niveles de azúcar en braxton son diferentes cuando no estás embarazada:  Antes del desayuno: Menos de 100  2 horas después de olga comida: Menos de 140  If you have elevated numbers, contact your OB/GYN or primary care provider.    - Continue with healthy eating and physical activity to get back to your pre-pregnancy weight.   - Have a follow-up 2-hour Glucose  Tolerance Test at your 6-week post-partum check-up.   - Have your fasting blood sugar checked once a year.  - Plan ahead for future pregnancies - eat healthy, keep active, work with your doctor to check for gestational diabetes early on in the pregnancy and check blood sugars as recommended by your doctor.      - Continúe con olga alimentación saludable y actividad física para recuperar walton peso previo al embarazo.  - Realice olga prueba de tolerancia a la glucosa de 2 horas de seguimiento en walton control posparto de las 6 semanas.  - Realice olga medición de glucosa en ayunas olga vez al año.  - Planifique con anticipación blanca futuros embarazos: coma yasmine, manténgase activa, colabore con walton médico para detectar diabetes gestacional al inicio del embarazo y controle walton nivel de glucosa según las recomendaciones de walton médico.    Catalina Hickey RDN, BASIL[place], [facility]  Diabetes Education Triage Line: [phone]  Diabetes Education Appointment [phone]    Línea de triaje para educación sobre diabetes: [phone] (correo de voz)  Programación de citas para educación sobre diabetes: [phone]

## 2025-04-08 NOTE — PROGRESS NOTES
Please see full imaging report from [facility] program under imaging tab.    Surya Kang MD  Maternal Fetal Medicine

## 2025-04-08 NOTE — LETTER
4/8/2025         RE: Yolanda Damian  [address]  [facility] [address]        Dear Colleague,    Thank you for referring your patient, Yolanda Damian, to the [facility]. Please see a copy of my visit note below.    .gdmb    Gestational Diabetes Self-Management Education & Support  Type of Service: In Person Visit    Assessment  Ketones: Negative.   Fasting blood glucoses: 33% in target.  After breakfast: 100% in target.  After lunch: 100% in target.  After dinner: 100% in target.    Yolanda reports she is no longer having shakes for breakfast. She is having yogurt with granola or toast with peanut butter and fasting glucose in target since making these changes and also starting NPH insulin. She reports doing Ok with insulin injections and has no questions today.  Reviewed technique as well.      Intervention  Educational topics covered today:  What to expect after delivery, future testing for Type 2 diabetes (2 hour OGTT at 6 week post-partum check-up and annual fasting blood glucose level), risk of GDM and planning ahead for future pregnancies, recommended lifestyle interventions for reducing the risk of Type 2 Diabetes, when to call a Diabetes Educator or OB provider    Educational Materials provided today:  [facility] Diabetes    Patient verbalized understanding of diabetes self-management education concepts discussed, opportunities for ongoing education and support, and recommendations provided today    Plan  Increase NPH insulin to 12 units tonight.  Check glucose four times daily, before breakfast and 1 hour after each meal.  Check ketones once a week when readings are consistently negative.  Continue with recommended physical activity.  Continue to follow consistent carbohydrate meal plan, eat carbohydrates and protein/fat at all meals/snacks.    If 3 or more blood sugars are above the goal at a given time, or if Ketones are small,  moderate or large, call or MyChart message the diabetes educator.  All reminders translated to Georgian and printed the AVS for her today.     Follow-up:    Follow up on Upcoming Diabetes Ed Appointments     Visit Type Date Time Department    GDM FOLLOW UP 2025  1:00 PM [facility]        Subjective/Objective  Yolanda is an 44 year old year old, presenting for the following diabetes education related to:      Accompanied by: Self, Spouse, Son  Diabetes management related comments/concerns: Insulin review  Gestational weeks: 36 weeks 2 days  Next OB Visit Date: 25  Number of previous pregnancies: 1  Had any babies over 9 lbs: No  Previously had Gestational Diabetes: No    Cultural Influences/Ethnic Background:   or     LMP 2024       Estimated Date of Delivery: May 4, 2025    Blood Glucose/Ketone Log:     Date Ketones Fasting Post Breakfast Post Lunch Post Supper    neg 98 97  Increase to 12 units    neg 92 94 83 125    neg 97 71 114 96     91 162 81 102- started 10 units NPH     98 119 116 115   4/3  98 152 134 118     94 158 122 110       Healthy Eating:  Pre-pregnancy weight (lbs): 156  Exercise:: Currently not exercising  Meal planning/habits: None  How many times a week on average do you eat food made away from home (restaurant/take-out)?: 1  Meals include: Breakfast, Lunch, Dinner  Breakfast: 10:00 AM: yogurt with granola, milk OR regular jello  Lunch: 12:30: beans or lentils with steak, aqua  Dinner: 6:30-7:00: cereal or yogurt or milk  Snacks: AM: no PM: milk or yogurt HS: no  Other: Does not drink sweetened beverages  Beverages: Water  Pre- vitamin?: Yes  Supplements?: No  Experiencing nausea?: No  Experiencing heartburn?: No    Healthy Coping:  Emotional response to diabetes: Ready to learn, Concern for health and well-being  Informal Support system:: Family  Stage of change: ACTION (Actively working towards change)    Current Management:  Taking  medications for gestational diabetes?: No  Difficulty affording diabetes medication?: No  Difficulty affording diabetes testing supplies?: No    Catalina Hickey RD  Time Spent: 30 minutes  Encounter Type: Individual     Diabetes medication dose changes were [facility] under the patient's referring provider.

## 2025-04-08 NOTE — NURSING NOTE
" present for [facility] office visit via iPad.  ID # [id number]. Patient reports positive fetal movement, denies pain, denies contractions/pre-term labor, leaking of fluid, or bleeding. Reports blood sugar values fasting and post prandial all within range (\"not too high, not too low\"). Patient denies headache, visual changes, nausea/vomiting, epigastric pain related to preeclampsia. Education provided to patient on BPP. SBAR given to M MD, see their note in Epic.    Meredith Berg RN      "

## 2025-04-11 PROBLEM — Z34.90 ENCOUNTER FOR INDUCTION OF LABOR: Status: RESOLVED | Noted: 2023-07-11 | Resolved: 2025-04-11

## 2025-04-11 PROBLEM — O28.5 ABNORMAL GENETIC TEST IN PREGNANCY: Status: ACTIVE | Noted: 2025-04-11

## 2025-04-11 PROBLEM — O24.414 WHITE CLASSIFICATION A2 GESTATIONAL DIABETES MELLITUS (GDM), INSULIN CONTROLLED: Status: ACTIVE | Noted: 2025-03-21

## 2025-04-15 ENCOUNTER — HOSPITAL ENCOUNTER (OUTPATIENT)
Dept: ULTRASOUND IMAGING | Facility: CLINIC | Age: 45
Discharge: HOME | End: 2025-04-15
Attending: OBSTETRICS & GYNECOLOGY | Admitting: OBSTETRICS & GYNECOLOGY
Payer: COMMERCIAL

## 2025-04-15 ENCOUNTER — OFFICE VISIT (OUTPATIENT)
Dept: MATERNAL FETAL MEDICINE | Facility: CLINIC | Age: 45
End: 2025-04-15
Attending: OBSTETRICS & GYNECOLOGY
Payer: COMMERCIAL

## 2025-04-15 DIAGNOSIS — O36.5930 MATERNAL CARE FOR OTHER KNOWN OR SUSPECTED POOR FETAL GROWTH, THIRD TRIMESTER, NOT APPLICABLE OR UNSPECIFIED: ICD-10-CM

## 2025-04-15 DIAGNOSIS — O09.523 MULTIGRAVIDA OF ADVANCED MATERNAL AGE IN THIRD TRIMESTER: ICD-10-CM

## 2025-04-15 DIAGNOSIS — O24.419 GDM, CLASS A2: Primary | ICD-10-CM

## 2025-04-15 PROCEDURE — 76819 FETAL BIOPHYS PROFIL W/O NST: CPT

## 2025-04-15 NOTE — NURSING NOTE
Patient presents to [facility] for BPP at 37w2d due to AMA >40, GDMA2, NIPT +XXY. Pt states she started insulin for GDM at the beginning of this month. Reports blood sugar values fasting <95 and 1 hr post prandial <140.    Positive fetal movement. Denies LOF, vaginal bleeding or cramping/contractions.  utilized via ipad for patients appt today. SBAR given to KINGSLEY MARIE, see their note in Epic.

## 2025-04-15 NOTE — PROGRESS NOTES
The patient was seen for an [facility] in [facility] today.  For a detailed report of the ultrasound examination, please see the ultrasound report which can be found under the imaging tab.    If you have questions regarding today's evaluation or if we can be of further service, please contact the [facility].    Olga Cruz M.D.  Maternal Fetal-Medicine Specialist

## 2025-04-18 ENCOUNTER — HOSPITAL ENCOUNTER (OUTPATIENT)
Dept: ULTRASOUND IMAGING | Facility: CLINIC | Age: 45
Discharge: HOME | End: 2025-04-18
Attending: STUDENT IN AN ORGANIZED HEALTH CARE EDUCATION/TRAINING PROGRAM
Payer: COMMERCIAL

## 2025-04-18 DIAGNOSIS — O24.419 GDM, CLASS A2: ICD-10-CM

## 2025-04-18 PROCEDURE — 76819 FETAL BIOPHYS PROFIL W/O NST: CPT | Mod: 26 | Performed by: STUDENT IN AN ORGANIZED HEALTH CARE EDUCATION/TRAINING PROGRAM

## 2025-04-18 PROCEDURE — 76819 FETAL BIOPHYS PROFIL W/O NST: CPT

## 2025-04-22 ENCOUNTER — HOSPITAL ENCOUNTER (OUTPATIENT)
Dept: ULTRASOUND IMAGING | Facility: CLINIC | Age: 45
Discharge: HOME | End: 2025-04-22
Attending: OBSTETRICS & GYNECOLOGY | Admitting: OBSTETRICS & GYNECOLOGY
Payer: COMMERCIAL

## 2025-04-22 ENCOUNTER — OFFICE VISIT (OUTPATIENT)
Dept: MATERNAL FETAL MEDICINE | Facility: CLINIC | Age: 45
End: 2025-04-22
Attending: OBSTETRICS & GYNECOLOGY
Payer: COMMERCIAL

## 2025-04-22 DIAGNOSIS — O09.523 MULTIGRAVIDA OF ADVANCED MATERNAL AGE IN THIRD TRIMESTER: ICD-10-CM

## 2025-04-22 DIAGNOSIS — O24.414 INSULIN CONTROLLED GESTATIONAL DIABETES MELLITUS (GDM) IN THIRD TRIMESTER: Primary | ICD-10-CM

## 2025-04-22 DIAGNOSIS — O36.5930 MATERNAL CARE FOR OTHER KNOWN OR SUSPECTED POOR FETAL GROWTH, THIRD TRIMESTER, NOT APPLICABLE OR UNSPECIFIED: ICD-10-CM

## 2025-04-22 PROCEDURE — 76819 FETAL BIOPHYS PROFIL W/O NST: CPT

## 2025-04-22 NOTE — PROGRESS NOTES
Please see the imaging tab for details of the ultrasound performed today.    Ragini Bedolla MD  Specialist in Maternal-Fetal Medicine

## 2025-04-22 NOTE — NURSING NOTE
Patient presents to [facility] for RL2 at 38w2d due to GDMA2, AMA >40, NIPT +XXY. Reports blood sugar values fasting <95 and 1 hr post prandial <140. Positive fetal movement. Denies LOF, vaginal bleeding or cramping/contractions. SBAR given to [facility] MD, see their note in Epic.

## 2025-04-25 ENCOUNTER — HOSPITAL ENCOUNTER (OUTPATIENT)
Dept: ULTRASOUND IMAGING | Facility: CLINIC | Age: 45
Discharge: HOME | End: 2025-04-25
Attending: OBSTETRICS & GYNECOLOGY | Admitting: OBSTETRICS & GYNECOLOGY
Payer: COMMERCIAL

## 2025-04-25 DIAGNOSIS — O24.419 GDM, CLASS A2: ICD-10-CM

## 2025-04-25 PROCEDURE — 76819 FETAL BIOPHYS PROFIL W/O NST: CPT | Mod: 26 | Performed by: OBSTETRICS & GYNECOLOGY

## 2025-04-25 PROCEDURE — 76819 FETAL BIOPHYS PROFIL W/O NST: CPT

## 2025-04-27 ENCOUNTER — ANESTHESIA EVENT (OUTPATIENT)
Dept: OBGYN | Facility: CLINIC | Age: 45
End: 2025-04-27
Payer: COMMERCIAL

## 2025-04-27 ENCOUNTER — ANESTHESIA (OUTPATIENT)
Dept: OBGYN | Facility: CLINIC | Age: 45
End: 2025-04-27
Payer: COMMERCIAL

## 2025-04-27 ENCOUNTER — HOSPITAL ENCOUNTER (INPATIENT)
Facility: CLINIC | Age: 45
LOS: 2 days | Discharge: HOME | End: 2025-04-29
Attending: OBSTETRICS & GYNECOLOGY | Admitting: OBSTETRICS & GYNECOLOGY
Payer: COMMERCIAL

## 2025-04-27 DIAGNOSIS — O09.523 MULTIGRAVIDA OF ADVANCED MATERNAL AGE IN THIRD TRIMESTER: ICD-10-CM

## 2025-04-27 LAB
ABO + RH BLD: NORMAL
BLD GP AB SCN SERPL QL: NEGATIVE
ERYTHROCYTE [DISTWIDTH] IN BLOOD BY AUTOMATED COUNT: 14.3 % (ref 10–15)
GLUCOSE BLDC GLUCOMTR-MCNC: 115 MG/DL (ref 70–99)
GLUCOSE BLDC GLUCOMTR-MCNC: 148 MG/DL (ref 70–99)
HCT VFR BLD AUTO: 33.1 % (ref 35–47)
HGB BLD-MCNC: 11.1 G/DL (ref 11.7–15.7)
MCH RBC QN AUTO: 30.3 PG (ref 26.5–33)
MCHC RBC AUTO-ENTMCNC: 33.5 G/DL (ref 31.5–36.5)
MCV RBC AUTO: 90 FL (ref 78–100)
PLATELET # BLD AUTO: 231 10E3/UL (ref 150–450)
RBC # BLD AUTO: 3.66 10E6/UL (ref 3.8–5.2)
SPECIMEN EXP DATE BLD: NORMAL
WBC # BLD AUTO: 6.9 10E3/UL (ref 4–11)

## 2025-04-27 PROCEDURE — 258N000003 HC RX IP 258 OP 636: Performed by: OBSTETRICS & GYNECOLOGY

## 2025-04-27 PROCEDURE — 250N000011 HC RX IP 250 OP 636: Mod: JZ | Performed by: ANESTHESIOLOGY

## 2025-04-27 PROCEDURE — 250N000013 HC RX MED GY IP 250 OP 250 PS 637: Performed by: OBSTETRICS & GYNECOLOGY

## 2025-04-27 PROCEDURE — 00HU33Z INSERTION OF INFUSION DEVICE INTO SPINAL CANAL, PERCUTANEOUS APPROACH: ICD-10-PCS | Performed by: ANESTHESIOLOGY

## 2025-04-27 PROCEDURE — 3E0R3BZ INTRODUCTION OF ANESTHETIC AGENT INTO SPINAL CANAL, PERCUTANEOUS APPROACH: ICD-10-PCS | Performed by: ANESTHESIOLOGY

## 2025-04-27 PROCEDURE — 85048 AUTOMATED LEUKOCYTE COUNT: CPT | Performed by: OBSTETRICS & GYNECOLOGY

## 2025-04-27 PROCEDURE — 86901 BLOOD TYPING SEROLOGIC RH(D): CPT | Performed by: OBSTETRICS & GYNECOLOGY

## 2025-04-27 PROCEDURE — 86780 TREPONEMA PALLIDUM: CPT | Performed by: OBSTETRICS & GYNECOLOGY

## 2025-04-27 PROCEDURE — 250N000011 HC RX IP 250 OP 636: Mod: JZ | Performed by: OBSTETRICS & GYNECOLOGY

## 2025-04-27 PROCEDURE — 120N000001 HC R&B MED SURG/OB

## 2025-04-27 PROCEDURE — 250N000012 HC RX MED GY IP 250 OP 636 PS 637: Performed by: OBSTETRICS & GYNECOLOGY

## 2025-04-27 PROCEDURE — 370N000003 HC ANESTHESIA WARD SERVICE: Performed by: ANESTHESIOLOGY

## 2025-04-27 RX ORDER — NICOTINE POLACRILEX 4 MG
15-30 LOZENGE BUCCAL
Status: DISCONTINUED | OUTPATIENT
Start: 2025-04-27 | End: 2025-04-28 | Stop reason: HOSPADM

## 2025-04-27 RX ORDER — SODIUM CHLORIDE 9 MG/ML
INJECTION, SOLUTION INTRAVENOUS CONTINUOUS PRN
Status: DISCONTINUED | OUTPATIENT
Start: 2025-04-27 | End: 2025-04-28

## 2025-04-27 RX ORDER — CITRIC ACID/SODIUM CITRATE 334-500MG
30 SOLUTION, ORAL ORAL
Status: DISCONTINUED | OUTPATIENT
Start: 2025-04-27 | End: 2025-04-28 | Stop reason: HOSPADM

## 2025-04-27 RX ORDER — MISOPROSTOL 200 UG/1
400 TABLET ORAL
Status: DISCONTINUED | OUTPATIENT
Start: 2025-04-27 | End: 2025-04-28 | Stop reason: HOSPADM

## 2025-04-27 RX ORDER — MISOPROSTOL 100 UG/1
25 TABLET ORAL EVERY 4 HOURS
Status: DISCONTINUED | OUTPATIENT
Start: 2025-04-27 | End: 2025-04-28 | Stop reason: HOSPADM

## 2025-04-27 RX ORDER — PROCHLORPERAZINE MALEATE 10 MG
10 TABLET ORAL EVERY 6 HOURS PRN
Status: DISCONTINUED | OUTPATIENT
Start: 2025-04-27 | End: 2025-04-28 | Stop reason: HOSPADM

## 2025-04-27 RX ORDER — METHYLERGONOVINE MALEATE 0.2 MG/ML
200 INJECTION INTRAVENOUS
Status: DISCONTINUED | OUTPATIENT
Start: 2025-04-27 | End: 2025-04-28 | Stop reason: HOSPADM

## 2025-04-27 RX ORDER — DEXTROSE MONOHYDRATE 25 G/50ML
25-50 INJECTION, SOLUTION INTRAVENOUS
Status: DISCONTINUED | OUTPATIENT
Start: 2025-04-27 | End: 2025-04-28 | Stop reason: HOSPADM

## 2025-04-27 RX ORDER — ONDANSETRON 2 MG/ML
4 INJECTION INTRAMUSCULAR; INTRAVENOUS EVERY 6 HOURS PRN
Status: DISCONTINUED | OUTPATIENT
Start: 2025-04-27 | End: 2025-04-28 | Stop reason: HOSPADM

## 2025-04-27 RX ORDER — KETOROLAC TROMETHAMINE 15 MG/ML
15 INJECTION, SOLUTION INTRAMUSCULAR; INTRAVENOUS
Status: DISCONTINUED | OUTPATIENT
Start: 2025-04-27 | End: 2025-04-28

## 2025-04-27 RX ORDER — SODIUM CHLORIDE, SODIUM LACTATE, POTASSIUM CHLORIDE, CALCIUM CHLORIDE 600; 310; 30; 20 MG/100ML; MG/100ML; MG/100ML; MG/100ML
INJECTION, SOLUTION INTRAVENOUS CONTINUOUS
Status: DISCONTINUED | OUTPATIENT
Start: 2025-04-27 | End: 2025-04-28 | Stop reason: HOSPADM

## 2025-04-27 RX ORDER — NALBUPHINE HYDROCHLORIDE 10 MG/ML
2.5-5 INJECTION INTRAMUSCULAR; INTRAVENOUS; SUBCUTANEOUS EVERY 6 HOURS PRN
Status: DISCONTINUED | OUTPATIENT
Start: 2025-04-27 | End: 2025-04-28

## 2025-04-27 RX ORDER — OXYTOCIN/0.9 % SODIUM CHLORIDE 30/500 ML
100-340 PLASTIC BAG, INJECTION (ML) INTRAVENOUS CONTINUOUS PRN
Status: DISCONTINUED | OUTPATIENT
Start: 2025-04-27 | End: 2025-04-28

## 2025-04-27 RX ORDER — TRANEXAMIC ACID 10 MG/ML
1 INJECTION, SOLUTION INTRAVENOUS EVERY 30 MIN PRN
Status: DISCONTINUED | OUTPATIENT
Start: 2025-04-27 | End: 2025-04-28 | Stop reason: HOSPADM

## 2025-04-27 RX ORDER — NALOXONE HYDROCHLORIDE 0.4 MG/ML
0.4 INJECTION, SOLUTION INTRAMUSCULAR; INTRAVENOUS; SUBCUTANEOUS
Status: DISCONTINUED | OUTPATIENT
Start: 2025-04-27 | End: 2025-04-28

## 2025-04-27 RX ORDER — METOCLOPRAMIDE HYDROCHLORIDE 5 MG/ML
10 INJECTION INTRAMUSCULAR; INTRAVENOUS EVERY 6 HOURS PRN
Status: DISCONTINUED | OUTPATIENT
Start: 2025-04-27 | End: 2025-04-28 | Stop reason: HOSPADM

## 2025-04-27 RX ORDER — CARBOPROST TROMETHAMINE 250 UG/ML
250 INJECTION, SOLUTION INTRAMUSCULAR
Status: DISCONTINUED | OUTPATIENT
Start: 2025-04-27 | End: 2025-04-28 | Stop reason: HOSPADM

## 2025-04-27 RX ORDER — IBUPROFEN 800 MG/1
800 TABLET, FILM COATED ORAL
Status: DISCONTINUED | OUTPATIENT
Start: 2025-04-27 | End: 2025-04-28

## 2025-04-27 RX ORDER — OXYTOCIN 10 [USP'U]/ML
10 INJECTION, SOLUTION INTRAMUSCULAR; INTRAVENOUS
Status: DISCONTINUED | OUTPATIENT
Start: 2025-04-27 | End: 2025-04-28 | Stop reason: HOSPADM

## 2025-04-27 RX ORDER — FENTANYL CITRATE 50 UG/ML
100 INJECTION, SOLUTION INTRAMUSCULAR; INTRAVENOUS
Status: DISCONTINUED | OUTPATIENT
Start: 2025-04-27 | End: 2025-04-28 | Stop reason: HOSPADM

## 2025-04-27 RX ORDER — OXYTOCIN 10 [USP'U]/ML
10 INJECTION, SOLUTION INTRAMUSCULAR; INTRAVENOUS
Status: DISCONTINUED | OUTPATIENT
Start: 2025-04-27 | End: 2025-04-28

## 2025-04-27 RX ORDER — LOPERAMIDE HYDROCHLORIDE 2 MG/1
2 CAPSULE ORAL
Status: DISCONTINUED | OUTPATIENT
Start: 2025-04-27 | End: 2025-04-28 | Stop reason: HOSPADM

## 2025-04-27 RX ORDER — DEXTROSE MONOHYDRATE 25 G/50ML
25-50 INJECTION, SOLUTION INTRAVENOUS
Status: DISCONTINUED | OUTPATIENT
Start: 2025-04-27 | End: 2025-04-28

## 2025-04-27 RX ORDER — OXYTOCIN/0.9 % SODIUM CHLORIDE 30/500 ML
340 PLASTIC BAG, INJECTION (ML) INTRAVENOUS CONTINUOUS PRN
Status: DISCONTINUED | OUTPATIENT
Start: 2025-04-27 | End: 2025-04-28 | Stop reason: HOSPADM

## 2025-04-27 RX ORDER — METOCLOPRAMIDE 10 MG/1
10 TABLET ORAL EVERY 6 HOURS PRN
Status: DISCONTINUED | OUTPATIENT
Start: 2025-04-27 | End: 2025-04-28 | Stop reason: HOSPADM

## 2025-04-27 RX ORDER — ACETAMINOPHEN 325 MG/1
650 TABLET ORAL EVERY 4 HOURS PRN
Status: DISCONTINUED | OUTPATIENT
Start: 2025-04-27 | End: 2025-04-28 | Stop reason: HOSPADM

## 2025-04-27 RX ORDER — NICOTINE POLACRILEX 4 MG
15-30 LOZENGE BUCCAL
Status: DISCONTINUED | OUTPATIENT
Start: 2025-04-27 | End: 2025-04-28

## 2025-04-27 RX ORDER — NALOXONE HYDROCHLORIDE 0.4 MG/ML
0.2 INJECTION, SOLUTION INTRAMUSCULAR; INTRAVENOUS; SUBCUTANEOUS
Status: DISCONTINUED | OUTPATIENT
Start: 2025-04-27 | End: 2025-04-28

## 2025-04-27 RX ORDER — MISOPROSTOL 200 UG/1
800 TABLET ORAL
Status: DISCONTINUED | OUTPATIENT
Start: 2025-04-27 | End: 2025-04-28 | Stop reason: HOSPADM

## 2025-04-27 RX ORDER — FENTANYL CITRATE-0.9 % NACL/PF 10 MCG/ML
100 PLASTIC BAG, INJECTION (ML) INTRAVENOUS EVERY 5 MIN PRN
Status: DISCONTINUED | OUTPATIENT
Start: 2025-04-27 | End: 2025-04-28 | Stop reason: HOSPADM

## 2025-04-27 RX ORDER — DEXTROSE MONOHYDRATE 100 MG/ML
INJECTION, SOLUTION INTRAVENOUS CONTINUOUS PRN
Status: DISCONTINUED | OUTPATIENT
Start: 2025-04-27 | End: 2025-04-28

## 2025-04-27 RX ORDER — LOPERAMIDE HYDROCHLORIDE 2 MG/1
4 CAPSULE ORAL
Status: DISCONTINUED | OUTPATIENT
Start: 2025-04-27 | End: 2025-04-28 | Stop reason: HOSPADM

## 2025-04-27 RX ORDER — TERBUTALINE SULFATE 1 MG/ML
0.25 INJECTION SUBCUTANEOUS
Status: DISCONTINUED | OUTPATIENT
Start: 2025-04-27 | End: 2025-04-28 | Stop reason: HOSPADM

## 2025-04-27 RX ORDER — FENTANYL/BUPIVACAINE/NS/PF 2-1250MCG
PLASTIC BAG, INJECTION (ML) INJECTION
Status: COMPLETED
Start: 2025-04-27 | End: 2025-04-28

## 2025-04-27 RX ORDER — LIDOCAINE 40 MG/G
CREAM TOPICAL
Status: DISCONTINUED | OUTPATIENT
Start: 2025-04-27 | End: 2025-04-28

## 2025-04-27 RX ORDER — HYDROXYZINE HYDROCHLORIDE 50 MG/1
50 TABLET, FILM COATED ORAL
Status: DISCONTINUED | OUTPATIENT
Start: 2025-04-27 | End: 2025-04-28 | Stop reason: HOSPADM

## 2025-04-27 RX ORDER — ONDANSETRON 4 MG/1
4 TABLET, ORALLY DISINTEGRATING ORAL EVERY 6 HOURS PRN
Status: DISCONTINUED | OUTPATIENT
Start: 2025-04-27 | End: 2025-04-28 | Stop reason: HOSPADM

## 2025-04-27 RX ADMIN — MISOPROSTOL 25 MCG: 100 TABLET ORAL at 20:30

## 2025-04-27 RX ADMIN — INSULIN HUMAN 12 UNITS: 100 INJECTION, SUSPENSION SUBCUTANEOUS at 22:38

## 2025-04-27 RX ADMIN — BUPIVACAINE HYDROCHLORIDE 10 ML: 2.5 INJECTION, SOLUTION EPIDURAL; INFILTRATION; INTRACAUDAL at 23:47

## 2025-04-27 RX ADMIN — SODIUM CHLORIDE, SODIUM LACTATE, POTASSIUM CHLORIDE, AND CALCIUM CHLORIDE 500 ML: .6; .31; .03; .02 INJECTION, SOLUTION INTRAVENOUS at 23:25

## 2025-04-27 RX ADMIN — HYDROXYZINE HYDROCHLORIDE 50 MG: 50 TABLET, FILM COATED ORAL at 22:35

## 2025-04-27 RX ADMIN — FENTANYL CITRATE 100 MCG: 50 INJECTION, SOLUTION INTRAMUSCULAR; INTRAVENOUS at 23:17

## 2025-04-27 ASSESSMENT — ACTIVITIES OF DAILY LIVING (ADL)
ADLS_ACUITY_SCORE: 17

## 2025-04-28 PROBLEM — O09.523 MULTIGRAVIDA OF ADVANCED MATERNAL AGE IN THIRD TRIMESTER: Status: RESOLVED | Noted: 2023-05-30 | Resolved: 2025-04-28

## 2025-04-28 PROBLEM — O09.90 SUPERVISION OF HIGH RISK PREGNANCY, ANTEPARTUM: Status: RESOLVED | Noted: 2023-04-28 | Resolved: 2025-04-28

## 2025-04-28 PROBLEM — O28.5 ABNORMAL GENETIC TEST IN PREGNANCY: Status: RESOLVED | Noted: 2025-04-11 | Resolved: 2025-04-28

## 2025-04-28 PROBLEM — O24.414 WHITE CLASSIFICATION A2 GESTATIONAL DIABETES MELLITUS (GDM), INSULIN CONTROLLED: Status: RESOLVED | Noted: 2025-03-21 | Resolved: 2025-04-28

## 2025-04-28 PROBLEM — O09.299 PRIOR PREGNANCY WITH FETAL DEMISE: Status: RESOLVED | Noted: 2023-07-11 | Resolved: 2025-04-28

## 2025-04-28 PROBLEM — O09.299 PRIOR PREGNANCY WITH FETAL DEMISE: Status: ACTIVE | Noted: 2023-07-11

## 2025-04-28 LAB
GLUCOSE BLDC GLUCOMTR-MCNC: 103 MG/DL (ref 70–99)
GLUCOSE BLDC GLUCOMTR-MCNC: 115 MG/DL (ref 70–99)
T PALLIDUM AB SER QL: NONREACTIVE

## 2025-04-28 PROCEDURE — 250N000013 HC RX MED GY IP 250 OP 250 PS 637: Performed by: OBSTETRICS & GYNECOLOGY

## 2025-04-28 PROCEDURE — 999N000080 HC STATISTIC IP LACTATION SERVICES 16-30 MIN

## 2025-04-28 PROCEDURE — 258N000003 HC RX IP 258 OP 636: Performed by: OBSTETRICS & GYNECOLOGY

## 2025-04-28 PROCEDURE — 250N000009 HC RX 250: Performed by: OBSTETRICS & GYNECOLOGY

## 2025-04-28 PROCEDURE — 722N000001 HC LABOR CARE VAGINAL DELIVERY SINGLE

## 2025-04-28 PROCEDURE — 0HQ9XZZ REPAIR PERINEUM SKIN, EXTERNAL APPROACH: ICD-10-PCS | Performed by: OBSTETRICS & GYNECOLOGY

## 2025-04-28 PROCEDURE — 59410 OBSTETRICAL CARE: CPT | Performed by: OBSTETRICS & GYNECOLOGY

## 2025-04-28 PROCEDURE — 120N000001 HC R&B MED SURG/OB

## 2025-04-28 PROCEDURE — 250N000011 HC RX IP 250 OP 636: Performed by: OBSTETRICS & GYNECOLOGY

## 2025-04-28 PROCEDURE — 10907ZC DRAINAGE OF AMNIOTIC FLUID, THERAPEUTIC FROM PRODUCTS OF CONCEPTION, VIA NATURAL OR ARTIFICIAL OPENING: ICD-10-PCS | Performed by: OBSTETRICS & GYNECOLOGY

## 2025-04-28 RX ORDER — AMOXICILLIN 250 MG
2 CAPSULE ORAL
Status: DISCONTINUED | OUTPATIENT
Start: 2025-04-28 | End: 2025-04-29 | Stop reason: HOSPADM

## 2025-04-28 RX ORDER — DEXTROSE MONOHYDRATE 25 G/50ML
25-50 INJECTION, SOLUTION INTRAVENOUS
Status: DISCONTINUED | OUTPATIENT
Start: 2025-04-28 | End: 2025-04-29 | Stop reason: HOSPADM

## 2025-04-28 RX ORDER — ACETAMINOPHEN 325 MG/1
650 TABLET ORAL EVERY 4 HOURS PRN
Status: DISCONTINUED | OUTPATIENT
Start: 2025-04-28 | End: 2025-04-29 | Stop reason: HOSPADM

## 2025-04-28 RX ORDER — BISACODYL 10 MG
10 SUPPOSITORY, RECTAL RECTAL DAILY PRN
Status: DISCONTINUED | OUTPATIENT
Start: 2025-04-28 | End: 2025-04-29 | Stop reason: HOSPADM

## 2025-04-28 RX ORDER — BUPIVACAINE HYDROCHLORIDE 2.5 MG/ML
INJECTION, SOLUTION EPIDURAL; INFILTRATION; INTRACAUDAL; PERINEURAL
Status: COMPLETED | OUTPATIENT
Start: 2025-04-27 | End: 2025-04-27

## 2025-04-28 RX ORDER — POLYETHYLENE GLYCOL 3350 17 G/17G
17 POWDER, FOR SOLUTION ORAL DAILY PRN
Status: DISCONTINUED | OUTPATIENT
Start: 2025-04-28 | End: 2025-04-29 | Stop reason: HOSPADM

## 2025-04-28 RX ORDER — OXYTOCIN 10 [USP'U]/ML
10 INJECTION, SOLUTION INTRAMUSCULAR; INTRAVENOUS
Status: DISCONTINUED | OUTPATIENT
Start: 2025-04-28 | End: 2025-04-29 | Stop reason: HOSPADM

## 2025-04-28 RX ORDER — OXYTOCIN/0.9 % SODIUM CHLORIDE 30/500 ML
340 PLASTIC BAG, INJECTION (ML) INTRAVENOUS CONTINUOUS PRN
Status: DISCONTINUED | OUTPATIENT
Start: 2025-04-28 | End: 2025-04-29 | Stop reason: HOSPADM

## 2025-04-28 RX ORDER — MISOPROSTOL 200 UG/1
800 TABLET ORAL
Status: DISCONTINUED | OUTPATIENT
Start: 2025-04-28 | End: 2025-04-29 | Stop reason: HOSPADM

## 2025-04-28 RX ORDER — MISOPROSTOL 200 UG/1
400 TABLET ORAL
Status: DISCONTINUED | OUTPATIENT
Start: 2025-04-28 | End: 2025-04-29 | Stop reason: HOSPADM

## 2025-04-28 RX ORDER — LOPERAMIDE HYDROCHLORIDE 2 MG/1
4 CAPSULE ORAL
Status: DISCONTINUED | OUTPATIENT
Start: 2025-04-28 | End: 2025-04-29 | Stop reason: HOSPADM

## 2025-04-28 RX ORDER — HYDROCORTISONE 25 MG/G
CREAM TOPICAL 3 TIMES DAILY PRN
Status: DISCONTINUED | OUTPATIENT
Start: 2025-04-28 | End: 2025-04-29 | Stop reason: HOSPADM

## 2025-04-28 RX ORDER — NICOTINE POLACRILEX 4 MG
15-30 LOZENGE BUCCAL
Status: DISCONTINUED | OUTPATIENT
Start: 2025-04-28 | End: 2025-04-29 | Stop reason: HOSPADM

## 2025-04-28 RX ORDER — IBUPROFEN 800 MG/1
800 TABLET, FILM COATED ORAL EVERY 6 HOURS PRN
Status: DISCONTINUED | OUTPATIENT
Start: 2025-04-28 | End: 2025-04-29 | Stop reason: HOSPADM

## 2025-04-28 RX ORDER — METHYLERGONOVINE MALEATE 0.2 MG/ML
200 INJECTION INTRAVENOUS
Status: DISCONTINUED | OUTPATIENT
Start: 2025-04-28 | End: 2025-04-29 | Stop reason: HOSPADM

## 2025-04-28 RX ORDER — CARBOPROST TROMETHAMINE 250 UG/ML
250 INJECTION, SOLUTION INTRAMUSCULAR
Status: DISCONTINUED | OUTPATIENT
Start: 2025-04-28 | End: 2025-04-29 | Stop reason: HOSPADM

## 2025-04-28 RX ORDER — LOPERAMIDE HYDROCHLORIDE 2 MG/1
2 CAPSULE ORAL
Status: DISCONTINUED | OUTPATIENT
Start: 2025-04-28 | End: 2025-04-29 | Stop reason: HOSPADM

## 2025-04-28 RX ORDER — TRANEXAMIC ACID 10 MG/ML
1 INJECTION, SOLUTION INTRAVENOUS EVERY 30 MIN PRN
Status: DISCONTINUED | OUTPATIENT
Start: 2025-04-28 | End: 2025-04-29 | Stop reason: HOSPADM

## 2025-04-28 RX ADMIN — Medication 340 ML/HR: at 00:24

## 2025-04-28 RX ADMIN — IBUPROFEN 800 MG: 800 TABLET, FILM COATED ORAL at 08:12

## 2025-04-28 RX ADMIN — FENTANYL CITRATE: 0.05 INJECTION, SOLUTION INTRAMUSCULAR; INTRAVENOUS at 00:02

## 2025-04-28 RX ADMIN — Medication: at 00:02

## 2025-04-28 RX ADMIN — PSYLLIUM HUSK 1 PACKET: 3.4 POWDER ORAL at 09:50

## 2025-04-28 ASSESSMENT — ACTIVITIES OF DAILY LIVING (ADL)
ADLS_ACUITY_SCORE: 20
ADLS_ACUITY_SCORE: 24
ADLS_ACUITY_SCORE: 17
ADLS_ACUITY_SCORE: 24
ADLS_ACUITY_SCORE: 24
ADLS_ACUITY_SCORE: 17
ADLS_ACUITY_SCORE: 24
ADLS_ACUITY_SCORE: 17
ADLS_ACUITY_SCORE: 17
ADLS_ACUITY_SCORE: 24
ADLS_ACUITY_SCORE: 20
ADLS_ACUITY_SCORE: 24
ADLS_ACUITY_SCORE: 20
ADLS_ACUITY_SCORE: 24
ADLS_ACUITY_SCORE: 24
ADLS_ACUITY_SCORE: 20
ADLS_ACUITY_SCORE: 24
ADLS_ACUITY_SCORE: 17
ADLS_ACUITY_SCORE: 20

## 2025-04-28 NOTE — ANESTHESIA PREPROCEDURE EVALUATION
"Anesthesia Pre-Procedure Evaluation    Patient: Yolanda Damian   MRN: [id number] : 1980        Procedure : * No procedures listed *          Past Medical History:   Diagnosis Date    Gestational diabetes     History of ectopic pregnancy     History of recurrent miscarriages       Past Surgical History:   Procedure Laterality Date    SALPINGECTOMY  2015    due to ectopic rupture, in [place]      No Known Allergies   Social History     Tobacco Use    Smoking status: Never    Smokeless tobacco: Never   Substance Use Topics    Alcohol use: Not Currently      Wt Readings from Last 1 Encounters:   25 78.1 kg (172 lb 1.6 oz)        Anesthesia Evaluation            ROS/MED HX  ENT/Pulmonary:       Neurologic:       Cardiovascular:       METS/Exercise Tolerance:     Hematologic:       Musculoskeletal:       GI/Hepatic:       Renal/Genitourinary:       Endo:     (+)  type II DM,                    Psychiatric/Substance Use:       Infectious Disease:       Malignancy:       Other:            Physical Exam    Airway  airway exam normal           Respiratory Devices and Support         Dental       (+) Minor Abnormalities - some fillings, tiny chips      Cardiovascular   cardiovascular exam normal          Pulmonary   pulmonary exam normal                OUTSIDE LABS:  CBC:   Lab Results   Component Value Date    WBC 6.9 2025    HGB 11.1 (L) 2025    HGB 12.3 2023    HCT 33.1 (L) 2025     2025     BMP:   Lab Results   Component Value Date     (H) 2025     (H) 2025     COAGS: No results found for: \"PTT\", \"INR\", \"FIBR\"  POC: No results found for: \"BGM\", \"HCG\", \"HCGS\"  HEPATIC: No results found for: \"ALBUMIN\", \"PROTTOTAL\", \"ALT\", \"AST\", \"GGT\", \"ALKPHOS\", \"BILITOTAL\", \"BILIDIRECT\", \"DIANA\"  OTHER: No results found for: \"PH\", \"LACT\", \"A1C\", \"SANA\", \"PHOS\", \"MAG\", \"LIPASE\", \"AMYLASE\", \"TSH\", \"T4\", \"T3\", \"CRP\", \"SED\"    Anesthesia Plan    ASA Status:  2  "      Anesthesia Type: Epidural.              Consents    Anesthesia Plan(s) and associated risks, benefits, and realistic alternatives discussed. Questions answered and patient/representative(s) expressed understanding.     - Discussed:     - Discussed with:  , Patient            Postoperative Care            Comments:               Alfredito Kaur MD    Clinically Significant Risk Factors Present on Admission

## 2025-04-28 NOTE — PLAN OF CARE
"Assisted with latch and positioning at shift, started hand expression earlier today and supplemented with .8 ml of colostrum, educated pt and spouse via IPAD with  and in person as well; seen lactation JOHNY Mercer.  Problem: Adult Inpatient Plan of Care  Goal: Plan of Care Review  Description: The Plan of Care Review/Shift note should be completed every shift.  The Outcome Evaluation is a brief statement about your assessment that the patient is improving, declining, or no change.  This information will be displayed automatically on your shiftnote.  Outcome: Progressing  Flowsheets (Taken 4/28/2025 1432)  Plan of Care Reviewed With:   parent   spouse  Goal: Patient-Specific Goal (Individualized)  Description: You can add care plan individualizations to a care plan. Examples of Individualization might be:  \"Parent requests to be called daily at 9am for status\", \"I have a hard time hearing out of my right ear\", or \"Do not touch me to wake me up as it startlesme\".  Outcome: Progressing  Goal: Absence of Hospital-Acquired Illness or Injury  Outcome: Progressing  Intervention: Prevent Skin Injury  Recent Flowsheet Documentation  Taken 4/28/2025 0741 by Kaylee Carlson RN  Body Position: position changed independently  Goal: Optimal Comfort and Wellbeing  Outcome: Progressing  Intervention: Monitor Pain and Promote Comfort  Recent Flowsheet Documentation  Taken 4/28/2025 1312 by Kaylee Carlson RN  Pain Management Interventions: declines  Taken 4/28/2025 0812 by Kaylee Carlson RN  Pain Management Interventions: medication (see MAR)  Intervention: Provide Person-Centered Care  Recent Flowsheet Documentation  Taken 4/28/2025 0741 by Kaylee Carlson RN  Trust Relationship/Rapport:   care explained   choices provided   emotional support provided   empathic listening provided   questions answered   questions encouraged   reassurance provided   thoughts/feelings acknowledged  Goal: " Readiness for Transition of Care  Outcome: Progressing     Problem: Postpartum (Vaginal Delivery)  Goal: Successful Parent Role Transition  Outcome: Progressing  Intervention: Support Parent Role Transition  Recent Flowsheet Documentation  Taken 4/28/2025 0741 by Kaylee Carlson RN  Supportive Measures:   active listening utilized   decision-making supported  Parent-Child Attachment Promotion:   caring behavior modeled   cue recognition promoted   rooming-in promoted  Goal: Hemostasis  Outcome: Progressing  Goal: Absence of Infection Signs and Symptoms  Outcome: Progressing  Intervention: Prevent or Manage Infection  Recent Flowsheet Documentation  Taken 4/28/2025 0741 by Kaylee Carlson RN  Infection Management: aseptic technique maintained  Goal: Anesthesia/Sedation Recovery  Outcome: Progressing  Goal: Optimal Pain Control and Function  Outcome: Progressing  Intervention: Prevent or Manage Pain  Recent Flowsheet Documentation  Taken 4/28/2025 1312 by Kaylee Carlson RN  Pain Management Interventions: declines  Taken 4/28/2025 0812 by Kaylee Carlson RN  Pain Management Interventions: medication (see MAR)  Taken 4/28/2025 0741 by Kaylee Carlson RN  Perineal Care: absorbent brief/pad changed  Goal: Effective Urinary Elimination  Outcome: Progressing   Goal Outcome Evaluation:      Plan of Care Reviewed With: parent, spouse

## 2025-04-28 NOTE — H&P
No significant change in general health status based on examination of the patient, review of Nursing Admission Database and prenatal record.    Pt admitted for Cx ripening and induction of labor due to Class A2 GDM at 39w0d.    Benny Joseph MD

## 2025-04-28 NOTE — PLAN OF CARE
Data: Yolanda Damian transferred to [facility] via wheelchair at 0245. Baby transferred via parent's arms.  Action: Receiving unit notified of transfer: Yes. Patient and family notified of room change. Report given to JOHNY Livingston at 0250. Belongings sent to receiving unit. Accompanied by Registered Nurse. Oriented patient to surroundings. Call light within reach. ID bands double-checked with receiving RN. Ipad interpretor at bedside. Patient able to void 400ml of urine prior to transfer.   Response: Patient tolerated transfer and is stable.    Patients mobililty level scored using the bedside mobility assistance tool (BMAT). Patient is at a mobility level test number: 4. Mobility equipment used: wheelchair and ambulatory. Required assist of 0 staff members. Further use of BMAT scoring not required.

## 2025-04-28 NOTE — PROVIDER NOTIFICATION
25   Provider Notification   Provider Name/Title Dr. Joseph   Method of Notification Phone   Request Evaluate - Remote   Notification Reason Patient Arrived;SVE;Status Update      45 yo F - arrived for cervical ripening this evening, SVE at  on unit - 2cm/60-70/ -2. I gave her a adams of 6, 2x contractions on toco since arriving, not feeling them, baby has moderate variability and accels. Hx of GDMA2 normally does 12 units of NPH at bedtime, blood sugar on arrival to the unit: 148. Pregnancy complicated by AMA, baby has klinefleter's. Orders placed for admission, vaginal cervical ripening, and diabetic order sets. Vitally stable, interpretor at bedside for admission on unit.

## 2025-04-28 NOTE — PROVIDER NOTIFICATION
Dr Joseph notified of elevated blood pressures immediately following epidural placement.  Patient was very uncomfortable.  No other elevated blood pressures prior to that time.  Will observe at this time and no new orders at this time.

## 2025-04-28 NOTE — LACTATION NOTE
Lactation in to visit with  IPAD. Patient states she successfully breast fed her first with no concerns and a good supply. Baby has been sleepy. Had hand expressed with feed previous to give volume back. Writer assisted with getting infant to breast STS. Baby needing some stimulation, then moved to a nutritive suck pattern with swallows heard. Praise given. Reviewed normal course of breastfeeding. Encouraged to watch for feeding cue to guide feeds, not going longer than 3 hours. Used Medela pump with last baby and would like the same. Encouraged to call for questions, concerns, or assistance.

## 2025-04-28 NOTE — ANESTHESIA POSTPROCEDURE EVALUATION
Patient: Yolanda Damian      S/P epidural for labor.   I or my partner was immediately available for management of this patient during epidural analgesia infusion.  VSS.  Doing well. Block resolved.  Neuro at baseline. Denies positional headache. Minimal side effects easily managed w/ PRN meds. No apparent anesthetic complications. No follow-up required.    Lalo Garcia MD      Note:  Anesthesia Post Evaluation    Last vitals:  Vitals:    04/28/25 0220 04/28/25 0310 04/28/25 0741   BP: 102/57 102/54 98/47   Pulse:  66    Resp:  16 17   Temp:  98.2  F (36.8  C) 98.4  F (36.9  C)   SpO2:          Electronically Signed By: Lalo Garcia MD  April 28, 2025  12:47 PM

## 2025-04-28 NOTE — L&D DELIVERY NOTE
OB Vaginal Delivery Note    Yolanda Damian MRN# [id number]   Age: 44 year old YOB: 1980       GA: 39w1d  GP:   Labor Complications: None  EBL:   mL  Delivery QBL:    Delivery Type: Vaginal, Spontaneous  ROM to Delivery Time: (Delivered) Minutes: 4  [place] Weight:     1 Minute 5 Minute 10 Minute   Apgar Totals: 7   9        JUAQUIN CLEMENS;MIS CHAU    Delivery Details:  Yolanda Damian, a 44 year old  female delivered a viable infant with apgars of 7  and 9 . Patient was fully dilated and pushing after 0 hours 30 minutes in active labor. Delivery was via vaginal, spontaneous to a sterile field under epidural anesthesia. Infant delivered in vertex middle occiput anterior position. Anterior and posterior shoulders delivered without difficulty. The cord was clamped, cut twice and 3 vessels were noted. Cord blood was obtained in routine fashion with the following disposition: lab.      Cord complications: nuchal  Placenta delivered at 2025 12:25 AM. Placental disposition was Hospital disposal. Fundal massage performed and fundus found to be firm.     Episiotomy: none   Perineum, vagina, cervix were inspected, and the following lacerations were noted:   Perineal lacerations: 1st               Any lacerations were repaired in the usual fashion using 4-O Vicryl.    Excellent hemostasis was noted. Needle count correct. Infant and patient in delivery room in good and stable condition.        Katie Damian, Male-Yolanda [[id number]]      Labor Event Times      Active labor onset date: 25 Onset time: 11:30 PM   Dilation complete date: 25 Complete time: 12:00 AM   Start pushing date/time: 2025 0020          Labor Length      1st Stage (hrs): 0 (min): 30   2nd Stage (hrs): 0 (min): 22   3rd Stage (hrs): 0 (min): 3          Labor Events     labor?: No   steroids: None  Labor Type: Induction/Cervical ripening  Predominate monitoring  during 1st stage: continuous electronic fetal monitoring     Antibiotics received during labor?: No     Rupture identifier: Sac 1  Rupture date/time: 25 0018   Rupture type: Artificial Rupture of Membranes  Fluid color: Clear  Fluid odor: Normal     Induction: Misoprostol  Induction date/time: 25    Cervical ripening date/time: 25    Indications for induction: Diabetes (Comment to specify)       Delivery/Placenta Date and Time      Delivery Date: 25 Delivery Time: 12:22 AM   Placenta Date/Time: 2025 12:25 AM  Oxytocin given at the time of delivery: after delivery of baby  Delivering clinician: Benny Joseph MD   Other personnel present at delivery:  Provider Role   Jennifer Bowens RN Registered Nurse   Cherry Sanz RN Registered Nurse             Vaginal Counts       Initial count performed by 2 team members:  Two Team Members   dr mary bowens rn         Needles Suture Needles Sponges (RETIRED) Instruments   Initial counts 2  5    Added to count  1     Relief counts       Final counts 2 1 5            Placed during labor Accounted for at the end of labor   FSE NA NA   IUPC NA NA   Cervidil NA NA                  Final count performed by 2 team members:  Two Team Members   dr mary bowens rn      Final count correct?: Yes       Apgars    Living status: Living   1 Minute 5 Minute 10 Minute 15 Minute 20 Minute   Skin color: 0  1       Heart rate: 2  2       Reflex irritability: 2  2       Muscle tone: 2  2       Respiratory effort: 1  2       Total: 7  9       Apgars assigned by: MAR BOWENS       Cord      Vessels: 3 Vessels    Cord Complications: Nuchal   Nuchal Intervention: reduced         Nuchal cord description: loose nuchal cord         Cord Blood Disposition: Lab    Gases Sent?: No    Delayed cord clamping?: Yes    Cord Clamping Delay (seconds): 31-60 seconds    Stem cell collection?: No           [place] Resuscitation    Methods:  "None        Measurements      Weight: 6 lb 11.2 oz Length: 1' 7\"     Head circumference: 33 cm           Skin to Skin and Feeding Plan      Skin to skin initiation date/time: 1841    Skin to skin with: Mother  Skin to skin end date/time:            Labor Events and Shoulder Dystocia    Fetal Tracing Prior to Delivery: Category 2  Fetal Tracing Comments: Mild variable decelerations in second stage labor  Shoulder dystocia present?: Neg       Delivery (Maternal) (Provider to Complete) ([id number])    Episiotomy: None  Perineal lacerations: 1st Repaired?: Yes   Repair suture: 4-0 Vicryl  Genital tract inspection done: Pos       Blood Loss  Mother: Yolanda Chowdary #[id number]     Start of Mother's Information      Delivery Blood Loss   Intrapartum & Postpartum: 25 2330 - 25 0633    Delivery Admission: 25 1915 - 25 0633         Intrapartum & Postpartum Delivery Admission    Delivery QBL (mL) Hospital Encounter 300 mL 300 mL    Total  300 mL 300 mL               End of Mother's Information  Mother: Yolanda Chowdary #[id number]                Delivery - Provider to Complete ([id number])    Delivering clinician: Benny Joseph MD  Delivery Type (Choose the 1 that will go to the Birth History): Vaginal, Spontaneous                         Other personnel:  Provider Role   Jennifer Byrd, RN Registered Nurse   Cherry Sanz RN Registered Nurse                    Placenta    Date/Time: 2025 12:25 AM  Removal: Spontaneous  Disposition: Hospital disposal             Anesthesia    Method: Epidural                    Presentation and Position    Presentation: Vertex    Position: Middle Occiput Anterior                     Benny Josehp MD    "

## 2025-04-28 NOTE — PROGRESS NOTES
[facility]   Post-Partum Progress Note          Assessment and Plan:    Assessment:   Post-partum day #0  Normal spontaneous vaginal delivery  L&D complications: None      Doing well.  Pain well-controlled.      Plan:   Ambulation encouraged  Anticipate discharge in 1-2 days           Interval History:     Doing well.  Pain is well-controlled.  No fevers.  No history of foul-smelling vaginal discharge.  Good appetite.  Denies chest pain, shortness of breath, nausea or vomiting.  Vaginal bleeding is similar to a heavy menstrual flow.  Ambulatory.  Breastfeeding well.          Significant Problems:    None          Review of Systems:    CONSTITUTIONAL: NEGATIVE for fever, chills, change in weight  INTEGUMENTARY/SKIN: NEGATIVE for worrisome rashes, moles or lesions  EYES: NEGATIVE for vision changes or irritation  ENT/MOUTH: NEGATIVE for ear, mouth and throat problems  RESP: NEGATIVE for significant cough or SOB  BREAST: NEGATIVE for masses, tenderness or discharge  CV: NEGATIVE for chest pain, palpitations or peripheral edema  GI: NEGATIVE for nausea, abdominal pain, heartburn, or change in bowel habits  : NEGATIVE for frequency, dysuria, or hematuria  MUSCULOSKELETAL: NEGATIVE for significant arthralgias or myalgia  NEURO: NEGATIVE for weakness, dizziness or paresthesias  ENDOCRINE: NEGATIVE for temperature intolerance, skin/hair changes  HEME: NEGATIVE for bleeding problems  PSYCHIATRIC: NEGATIVE for changes in mood or affect          Medications:   All medications related to the patient's surgery have been reviewed          Physical Exam:   All vitals stable  Patient Vitals for the past 12 hrs:   BP Temp Temp src Pulse Resp SpO2   04/28/25 0741 98/47 98.4  F (36.9  C) Oral -- 17 --   04/28/25 0310 102/54 98.2  F (36.8  C) Oral 66 16 --   04/28/25 0220 102/57 -- -- -- -- --   04/28/25 0150 116/58 -- -- -- -- --   04/28/25 0135 117/56 -- -- -- -- --   04/28/25 0120 119/62 -- -- -- -- --    04/28/25 0105 124/60 -- -- -- -- --   04/28/25 0050 128/56 -- -- -- -- 97 %   04/28/25 0045 -- -- -- -- -- 97 %   04/28/25 0040 -- -- -- -- -- 96 %   04/28/25 0035 115/61 -- -- -- -- 95 %   04/28/25 0030 -- -- -- -- -- 95 %   04/28/25 0025 -- -- -- -- -- 94 %   04/28/25 0024 -- 98.3  F (36.8  C) Oral -- -- --   04/28/25 0015 131/81 -- -- -- -- --   04/28/25 0010 118/61 -- -- -- -- 99 %   04/28/25 0005 (!) 151/79 -- -- -- -- 98 %   04/28/25 0000 (!) 152/69 -- -- -- -- 99 %   04/27/25 2357 133/74 -- -- -- -- 98 %   04/27/25 2355 134/79 -- -- -- -- 97 %   04/27/25 2350 (!) 142/80 -- -- -- -- 97 %   04/27/25 2345 136/87 -- -- -- -- --   04/27/25 2340 136/87 -- -- -- -- --     Uterine fundus is firm, non-tender and at the level of the umbilicus          Data:   All laboratory data related to this surgery reviewed  Hemoglobin   Date Value Ref Range Status   04/27/2025 11.1 (L) 11.7 - 15.7 g/dL Final   07/13/2023 12.3 11.7 - 15.7 g/dL Final   07/11/2023 12.7 11.7 - 15.7 g/dL Final     All imaging studies related to this surgery reviewed    Shea Lino DO

## 2025-04-28 NOTE — PLAN OF CARE
Data: [facility]: 2025  7:15 PM.  Patient admitted for induction for gestational diabetes. Patient is a .  Prenatal record reviewed. Pregnancy  has been complicated by GDMA2, AMA, and baby has klinefelter's.  Gestational Age 39w0d. VSS. Fetal movement active. Patient denies uterine contractions, leaking of vaginal fluid/rupture of membranes, vaginal bleeding, abdominal pain, pelvic pressure, nausea, vomiting, headache, visual disturbances, epigastric or URQ pain, significant edema. Support person is present (Spouse - Janesin).   Action: Verbal consent for EFM.  Admission assessment completed. Bill of rights reviewed.  Response: Patient verbalized agreement with plan. Will contact Dr Benny Joseph with update and further orders.

## 2025-04-28 NOTE — PLAN OF CARE
"Goal Outcome Evaluation:      Plan of Care Reviewed With: patient, spouse    Overall Patient Progress: improving    Outcome Evaluation: Pt progressing on care plan goals    Data: Vital signs within normal limits. Postpartum checks within normal limits - see flow record. Patient eating and drinking normally. Patient able to empty bladder independently and is up ambulating. Patient performing self cares, is able to care for infant and is breastfeeding every 2-3 hours.   Action: Patient declined pain medication this shift. See MAR. Adequate pain control noted by patient. Patient education done, see flow record.  Response: Positive attachment behaviors observed with infant. Patient's spouse present this shift and supportive. Ipad  utilized for education and assessments.   Plan: Continue current plan of care.  Anticipate discharge in 1-2 days. Will continue to monitor and treat.     Problem: Adult Inpatient Plan of Care  Goal: Plan of Care Review  Description: The Plan of Care Review/Shift note should be completed every shift.  The Outcome Evaluation is a brief statement about your assessment that the patient is improving, declining, or no change.  This information will be displayed automatically on your shiftnote.  Outcome: Progressing  Flowsheets (Taken 4/28/2025 1724)  Outcome Evaluation: Pt progressing on care plan goals  Plan of Care Reviewed With:   patient   spouse  Overall Patient Progress: improving  Goal: Patient-Specific Goal (Individualized)  Description: You can add care plan individualizations to a care plan. Examples of Individualization might be:  \"Parent requests to be called daily at 9am for status\", \"I have a hard time hearing out of my right ear\", or \"Do not touch me to wake me up as it startlesme\".  Outcome: Progressing  Goal: Absence of Hospital-Acquired Illness or Injury  Outcome: Progressing  Intervention: Prevent Infection  Recent Flowsheet Documentation  Taken 4/28/2025 1653 " by Fabienne Shea, RN  Infection Prevention:   rest/sleep promoted   hand hygiene promoted  Goal: Optimal Comfort and Wellbeing  Outcome: Progressing  Intervention: Provide Person-Centered Care  Recent Flowsheet Documentation  Taken 4/28/2025 1653 by Fabienne Shea, RN  Trust Relationship/Rapport:   care explained   choices provided   emotional support provided   empathic listening provided   questions answered   reassurance provided   thoughts/feelings acknowledged   questions encouraged  Goal: Readiness for Transition of Care  Outcome: Progressing     Problem: Postpartum (Vaginal Delivery)  Goal: Successful Parent Role Transition  Outcome: Progressing  Goal: Hemostasis  Outcome: Progressing  Goal: Absence of Infection Signs and Symptoms  Outcome: Progressing  Goal: Anesthesia/Sedation Recovery  Outcome: Progressing  Goal: Optimal Pain Control and Function  Outcome: Progressing  Goal: Effective Urinary Elimination  Outcome: Progressing

## 2025-04-28 NOTE — PLAN OF CARE
Assumed cares at 0245. Oriented to postpartum room. VSS. Up ad mariaelena. Voiding without difficulty. Lochia scant, no clots. Fundus firm and midline. Denies pain this shift. Breastfeeding, tolerating well. FOB supportive and at bedside. Bonding well with infant.  ipad used for all interactions.     Goal Outcome Evaluation:      Plan of Care Reviewed With: patient    Overall Patient Progress: improvingOverall Patient Progress: improving    Problem: Adult Inpatient Plan of Care  Goal: Plan of Care Review  Description: The Plan of Care Review/Shift note should be completed every shift.  The Outcome Evaluation is a brief statement about your assessment that the patient is improving, declining, or no change.  This information will be displayed automatically on your shiftnote.  Outcome: Progressing  Flowsheets (Taken 4/28/2025 0531)  Plan of Care Reviewed With: patient  Overall Patient Progress: improving  Goal: Absence of Hospital-Acquired Illness or Injury  Intervention: Prevent Skin Injury  Recent Flowsheet Documentation  Taken 4/28/2025 0310 by Yojana Gloria RN  Body Position: position changed independently  Intervention: Prevent Infection  Recent Flowsheet Documentation  Taken 4/28/2025 0310 by Yojana Gloria RN  Infection Prevention:   rest/sleep promoted   hand hygiene promoted   environmental surveillance performed  Goal: Optimal Comfort and Wellbeing  Intervention: Provide Person-Centered Care  Recent Flowsheet Documentation  Taken 4/28/2025 0310 by Yojana Gloria RN  Trust Relationship/Rapport:   care explained   choices provided   emotional support provided   empathic listening provided   questions answered   questions encouraged   reassurance provided   thoughts/feelings acknowledged     Problem: Labor  Goal: Stable Fetal Wellbeing  Intervention: Promote and Monitor Fetal Wellbeing  Recent Flowsheet Documentation  Taken 4/28/2025 0310 by Yojana Gloria RN  Body Position:  position changed independently  Goal: Absence of Infection Signs and Symptoms  Intervention: Prevent or Manage Infection  Recent Flowsheet Documentation  Taken 4/28/2025 0310 by Yojana Gloria, RN  Infection Prevention:   rest/sleep promoted   hand hygiene promoted   environmental surveillance performed     Problem: Postpartum (Vaginal Delivery)  Goal: Successful Parent Role Transition  Intervention: Support Parent Role Transition  Recent Flowsheet Documentation  Taken 4/28/2025 0310 by Yojana Gloria, RN  Supportive Measures:   active listening utilized   decision-making supported   goal-setting facilitated   positive reinforcement provided   problem-solving facilitated   relaxation techniques promoted   self-care encouraged   verbalization of feelings encouraged  Parent-Child Attachment Promotion:   caring behavior modeled   cue recognition promoted   face-to-face positioning promoted   interaction encouraged   parent/caregiver presence encouraged   participation in care promoted   positive reinforcement provided   rooming-in promoted   skin-to-skin contact encouraged   strengths emphasized

## 2025-04-28 NOTE — PROVIDER NOTIFICATION
Dr hernandez notified of patient getting more uncomfortable and SVE is 4.5 cm.  Receiving a dose of IV fentanyl at this time and is planning an epidural.  Category 1 tracing and contractions every 2-3 minutes.  Will start the active labor diabetes order set at this time.  Notified MD that patient did receive her HS NPH insulin at bedtime as she was comfortable at that time.  Plan to notify MD if any concerns or when needed for delivery.

## 2025-04-29 VITALS
SYSTOLIC BLOOD PRESSURE: 107 MMHG | TEMPERATURE: 97.9 F | RESPIRATION RATE: 18 BRPM | DIASTOLIC BLOOD PRESSURE: 46 MMHG | BODY MASS INDEX: 30.56 KG/M2 | HEART RATE: 63 BPM | WEIGHT: 167.1 LBS | OXYGEN SATURATION: 97 %

## 2025-04-29 LAB — GLUCOSE BLDC GLUCOMTR-MCNC: 88 MG/DL (ref 70–99)

## 2025-04-29 PROCEDURE — 250N000013 HC RX MED GY IP 250 OP 250 PS 637: Performed by: OBSTETRICS & GYNECOLOGY

## 2025-04-29 RX ORDER — IBUPROFEN 800 MG/1
800 TABLET, FILM COATED ORAL EVERY 6 HOURS PRN
Qty: 45 TABLET | Refills: 1 | Status: SHIPPED | OUTPATIENT
Start: 2025-04-29

## 2025-04-29 RX ORDER — ACETAMINOPHEN 325 MG/1
650 TABLET ORAL EVERY 4 HOURS PRN
Qty: 45 TABLET | Refills: 1 | Status: SHIPPED | OUTPATIENT
Start: 2025-04-29

## 2025-04-29 RX ADMIN — ACETAMINOPHEN 650 MG: 325 TABLET, FILM COATED ORAL at 08:55

## 2025-04-29 RX ADMIN — PSYLLIUM HUSK 1 PACKET: 3.4 POWDER ORAL at 08:56

## 2025-04-29 RX ADMIN — IBUPROFEN 800 MG: 800 TABLET, FILM COATED ORAL at 11:45

## 2025-04-29 RX ADMIN — IBUPROFEN 800 MG: 800 TABLET, FILM COATED ORAL at 06:14

## 2025-04-29 ASSESSMENT — ACTIVITIES OF DAILY LIVING (ADL)
ADLS_ACUITY_SCORE: 24

## 2025-04-29 NOTE — DISCHARGE INSTRUCTIONS
Warning Signs after Having a Baby    Keep this paper on your fridge or somewhere else where you can see it.    Call your provider if you have any of these symptoms up to 12 weeks after having your baby.    Thoughts of hurting yourself or your baby  Pain in your chest or trouble breathing  Severe headache not helped by pain medicine  Eyesight concerns (blurry vision, seeing spots or flashes of light, other changes to eyesight)  Fainting, shaking or other signs of a seizure    Call 9-1-1 if you feel that it is an emergency.     The symptoms below can happen to anyone after giving birth. They can be very serious. Call your provider if you have any of these warning signs.    My provider s phone number: _______________________    Losing too much blood (hemorrhage)    Call your provider if you soak through a pad in less than an hour or pass blood clots bigger than a golf ball. These may be signs that you are bleeding too much.    Blood clots in the legs or lungs    After you give birth, your body naturally clots its blood to help prevent blood loss. Sometimes this increased clotting can happen in other areas of the body, like the legs or lungs. This can block your blood flow and be very dangerous.     Call your provider if you:  Have a red, swollen spot on the back of your leg that is warm or painful when you touch it.   Are coughing up blood.     Infection    Call your provider if you have any of these symptoms:  Fever of 100.4 F (38 C) or higher.  Pain or redness around your stitches if you had an incision.   Any yellow, white, or green fluid coming from places where you had stitches or surgery.    Mood Problems (postpartum depression)    Many people feel sad or have mood changes after having a baby. But for some people, these mood swings are worse.     Call your provider right away if you feel so anxious or nervous that you can't care for yourself or your baby.    Preeclampsia (high blood pressure)    Even if you  "didn't have high blood pressure when you were pregnant, you are at risk for the high blood pressure disease called preeclampsia. This risk can last up to 12 weeks after giving birth.     Call your provider if you have:   Pain on your right side under your rib cage  Sudden swelling in the hands and face    Remember: You know your body. If something doesn't feel right, get medical help.     For informational purposes only. Not to replace the advice of your health care provider. Copyright 2020 [employer] [facility]. All rights reserved. Clinically reviewed by Cathryn Natarajan, RNC-OB, MSN. Listnerd 537037 - Rev 02/23.    El período posparto: Instrucciones de cuidado-Instrucciones de cuidado  Postpartum: Care Instructions  Instrucciones de cuidado  Después del parto (período posparto), walton cuerpo pasa por muchos cambios. Algunos de estos cambios suceden marshall varias semanas. Marshall las horas posteriores al parto, el cuerpo comienza a recuperarse y se prepara para el amamantamiento. Es posible que marshall vira tiempo se sienta sensible. Las hormonas pueden cambiar walton estado de ánimo sin advertencia y sin motivo aparente.  Marshall las primeras semanas después del parto, muchas mujeres tienen emociones que cambian de baltazar a halle. Es posible que le resulte difícil dormir. Es posible que llore mucho. [place] se llama \"melancolía de la maternidad\". Estas emociones abrumadoras suelen desaparecer dentro de unos días o semanas. Carla es importante hablar con walton médico acerca de blanca sentimientos.  Marshall las primeras semanas después del parto, es fácil cansarse demasiado o sentirse abrumada. No cynthia demasiados esfuerzos. Descanse cada vez que pueda, acepte la ayuda de los demás, coma dallas y didi abundantes líquidos.  En el primer par de semanas después de jatin a dustin, es posible que walton médico o partera deseen verla y hacer un plan para cualquier atención de seguimiento que usted pueda necesitar. Probablemente tendrá olga naman " completa de posparto dentro de los primeros 3 meses después del parto. En shahnaz momento, walton médico o partera revisarán walton recuperación del parto. Él o tania también verán cómo está enfrentando usted blanca emociones y conversarán sobre blanca inquietudes y preguntas.  La atención de seguimiento es olga parte clave de walton tratamiento y seguridad. Asegúrese de hacer y acudir a todas las citas, y llame a walton médico si está teniendo problemas. También es olga buena idea saber los resultados de blanca exámenes y mantener olga lista de los medicamentos que carla.   Cómo puede cuidarse en el hogar?  Duerma o descanse cuando walton bebé lo cynthia.  Si es posible, permita que blanca familiares o blanca amigos la ayuden con las tareas del hogar. No intente hacerlo todo usted gina.  Si tiene hemorroides o hinchazón o dolor alrededor de la abertura de la vagina, pruebe aplicarse frío y calor. Puede aplicarse hielo o olga compresa fría en la sreekanth marshall 10 a 20 minutos cada vez. Póngase un paño brewer entre el hielo y la piel. Además, trate de sentarse en algunos centímetros de agua tibia (baño de asiento) 3 veces al día y después de las evacuaciones.  [place] los analgésicos (medicamentos para el dolor) exactamente según las indicaciones.  Si el médico le recetó un analgésico, tómelo según las indicaciones.  Si no está tomando un analgésico recetado, pregúntele a walton médico si puede emilio kim de venta steven.  Coma más fibra para evitar el estreñimiento. Incluya alimentos amrik panes y cereales integrales, verduras crudas, frutas crudas y secas, y frijoles (habichuelas).  Jennifer mucho líquido. Si tiene olga enfermedad renal, cardíaca o hepática y tiene que restringir los líquidos, hable con walton médico antes de aumentar la cantidad de líquido que emily.  No se lave el interior de la vagina con líquidos (lavados vaginales).  Si tiene puntos de sutura, mantenga la sreekanth limpia con agua tibia, ya sea echándola o rociándola sobre la sreekanth externa de la vagina y el ano  después de usar el baño.  Lleve olga lista de preguntas para hacerle a walton médico o partera. Liliam preguntas podrían ser acerca de lo siguiente:  Cambios en los senos, amrik bultos o sensibilidad.  Cuándo esperar que regrese walton período menstrual.  Qué forma de anticoncepción es la más adecuada para usted.  El peso que aumentó marshall el embarazo.  Las opciones de ejercicio.  Qué alimentos y bebidas son mejores para usted, sobre todo si está amamantando.  Problemas que podría tener con la lactancia.  Cuándo puede tener relaciones sexuales. Algunas mujeres bolivar vez quieran hablar sobre lubricantes vaginales.  Cualquier sentimiento de tristeza o inquietud que tenga.   Cuándo debe pedir ayuda?  Comparta esta información con walton dada, walton rosanne o olga amiga. Pueden ayudarla a prestar atención a las señales de advertencia.  Llame al 911  en cualquier momento que considere que necesita atención de urgencia. Por ejemplo, llame si:    Tiene pensamientos de lastimarse o de hacerle daño a walton bebé o a otra persona.     Se desmayó (perdió el conocimiento).     Tiene dolor en el pecho, le falta el aire o tose braxton.     Tiene convulsiones.   Dónde obtener ayuda las 24 horas del día, los 7 días de la semana   Si usted o alguien que conoce habla de suicidio, autolesionarse, olga crisis de raiza mental, olga crisis por consumo de sustancias o cualquier otro tipo de malestar psíquico, obtenga ayuda de inmediato. Usted puede:    Marcar [phone] para llamar a la línea de prevención del suicidio y crisis.     Llamar al [phone] ([phone]).     Enviar un mensaje de texto que diga HOME al [id number] para acceder a la línea de mensajes de texto en casos de crisis.   Considere guardar estos números en walton teléfono.  Visite [employer][email or web address] para obtener más información o conversar en línea.  Llame a walton médico ahora mismo o busque atención médica de inmediato si:    Tiene señales de hemorragia (sangrado excesivo), amrik:  Sangrado vaginal intenso.  [place] significa que está empapando olga o más toallas sanitarias en olga hora. O expulsa coágulos de braxton más grandes que un huevo.  Se siente mareada o aturdida, o siente amrik si se fuera a desmayar.  Se siente tan cansada o débil que no puede hacer blanca actividades habituales.  Latidos cardíacos acelerados o irregulares.  Dolor abdominal nuevo o más intenso.     Tiene señales de infección, tales amrik:  Fiebre.  Micción frecuente o dolorosa o braxton en la orina.  Secreción vaginal con olor desagradable.  Dolor abdominal nuevo o más intenso.     Tiene síntomas de un coágulo de braxton en la pierna (llamado trombosis venosa profunda), tales amrik:  Dolor en la pantorrilla, la parte posterior de la rodilla, el muslo o la crystal.  Hinchazón en la pierna o la crystal.  Un cambio de color en la pierna o la crystal. La piel podría estar enrojecida o morada, según cuál sea walton color de piel normal.     Tiene señales de preeclampsia, tales amrik:  Hinchazón repentina de la una, las shola o los pies.  Nuevos problemas de la vista (amrik oscurecimiento, trevon borroso o trevon puntos).  Dolor de jin intenso.     Tiene señales de insuficiencia cardíaca, tales amrik:  Nueva o mayor falta de aire.  Nueva o mayor hinchazón en las piernas, los tobillos o los pies.  Aumento repentino de peso, amrik más de 2 o 3 libras (0.9 kg o 1.4 kg) en un día o 5 libras (2.3 kg) en olga semana.  Se siente tan cansada o débil que no puede hacer blanca actividades habituales.     Se sometió a un alivio del dolor raquídeo o epidural y tiene:  Dolor de espalda nuevo o peor.  Aumento del dolor, la hinchazón, la temperatura o el enrojecimiento en el lugar de la inyección.  Hormigueo, debilidad o entumecimiento en las piernas o la crystal.   Preste especial atención a los cambios en walton raiza y asegúrese de comunicarse con walton médico si:    El sangrado vaginal no disminuye.     Siente tristeza, ansiedad o desesperanza marshall más de algunos días.     Tiene problemas con los  "senos o el amamantamiento.    Dónde puede encontrar más información en inglés?  Vaya a [email or web address]  Escriba [id number] en la búsqueda para aprender más acerca de \"El período posparto: Instrucciones de cuidado-Instrucciones de cuidado.\"  Revisado: 30 abril, 2024  Versión del contenido: 14.4    [id number] [employer].   Las instrucciones de cuidado fueron adaptadas bajo licencia por walton profesional de atención médica. Si usted tiene preguntas sobre olga afección médica o sobre estas instrucciones, siempre pregunte a walton profesional de raiza. [employer] niega toda garantía o responsabilidad por walton uso de esta información.    "

## 2025-04-29 NOTE — PLAN OF CARE
"VSS, pt continues breast feeding well, stable BPs, discharge education completed with  via IPAD, follow up explained in 1 week for BP check and in 6 weeks with OB provider; discharging in stable condition with baby.  Problem: Adult Inpatient Plan of Care  Goal: Plan of Care Review  Description: The Plan of Care Review/Shift note should be completed every shift.  The Outcome Evaluation is a brief statement about your assessment that the patient is improving, declining, or no change.  This information will be displayed automatically on your shiftnote.  Outcome: Met  Flowsheets (Taken 4/29/2025 1230)  Plan of Care Reviewed With:   spouse   patient  Goal: Patient-Specific Goal (Individualized)  Description: You can add care plan individualizations to a care plan. Examples of Individualization might be:  \"Parent requests to be called daily at 9am for status\", \"I have a hard time hearing out of my right ear\", or \"Do not touch me to wake me up as it startlesme\".  Outcome: Met  Goal: Absence of Hospital-Acquired Illness or Injury  Outcome: Met  Intervention: Prevent Skin Injury  Recent Flowsheet Documentation  Taken 4/29/2025 0815 by Kaylee Carlson RN  Body Position: position changed independently  Goal: Optimal Comfort and Wellbeing  Outcome: Met  Intervention: Monitor Pain and Promote Comfort  Recent Flowsheet Documentation  Taken 4/29/2025 0855 by Kaylee Carlson RN  Pain Management Interventions: medication (see MAR)  Intervention: Provide Person-Centered Care  Recent Flowsheet Documentation  Taken 4/29/2025 0815 by Kaylee Carlson RN  Trust Relationship/Rapport:   care explained   choices provided   emotional support provided   empathic listening provided   questions answered   questions encouraged   reassurance provided   thoughts/feelings acknowledged  Goal: Readiness for Transition of Care  Outcome: Met     Problem: Postpartum (Vaginal Delivery)  Goal: Successful Parent Role " Transition  Outcome: Met  Intervention: Support Parent Role Transition  Recent Flowsheet Documentation  Taken 4/29/2025 0815 by Kaylee Carlson RN  Supportive Measures:   active listening utilized   decision-making supported  Parent-Child Attachment Promotion:   caring behavior modeled   cue recognition promoted   rooming-in promoted  Goal: Hemostasis  Outcome: Met  Goal: Absence of Infection Signs and Symptoms  Outcome: Met  Goal: Anesthesia/Sedation Recovery  Outcome: Met  Goal: Optimal Pain Control and Function  Outcome: Met  Intervention: Prevent or Manage Pain  Recent Flowsheet Documentation  Taken 4/29/2025 0855 by Kaylee Carlson, RN  Pain Management Interventions: medication (see MAR)  Goal: Effective Urinary Elimination  Outcome: Met   Goal Outcome Evaluation:      Plan of Care Reviewed With: spouse, patient

## 2025-04-29 NOTE — LACTATION NOTE
This note was copied from a baby's chart.  Lactation visit prior to discharge;  ipad utilized for visit.   Yolanda reports no questions/concerns for lactation. Reports breastfeeding well- mild nipple soreness. Reviewed importance of deep latch.  Education provided on daily I&O goals, cue based breastfeeds and q2-3 hours, and weight. Discussed when to expect milk transitioning.  Taking Medela pump home at discharge.

## 2025-04-29 NOTE — DISCHARGE SUMMARY
VAGINAL DELIVERY DISCHARGE SUMMARY    Admit date: 2025  Discharge date: 2025     Admit Dx:   44 year old  at 39w1d    Multigravida of advanced maternal age in third trimester [O09.523]   GDMA2  NIPT abnl XXY    Discharge Dx:  - Same as above, s/p     Procedures:  - Spontaneous vaginal delivery    Admit HPI:  Pt admitted for Cx ripening and induction of labor due to Class A2 GDM at 39w0d.      Please see her admit H&P for full details of her PMH, PSH, Meds, Allergies and exam on admit.    Hospital course:  Delivery Details:  Yolanda Damian, a 44 year old  female delivered a viable infant with apgars of 7  and 9 . Patient was fully dilated and pushing after 0 hours 30 minutes in active labor. Delivery was via vaginal, spontaneous to a sterile field under epidural anesthesia. Infant delivered in vertex middle occiput anterior position. Anterior and posterior shoulders delivered without difficulty. The cord was clamped, cut twice and 3 vessels were noted. Cord blood was obtained in routine fashion with the following disposition: lab.       Cord complications: nuchal  Placenta delivered at 2025 12:25 AM. Placental disposition was Hospital disposal. Fundal massage performed and fundus found to be firm.      Episiotomy: none   Perineum, vagina, cervix were inspected, and the following lacerations were noted:   Perineal lacerations: 1st                Any lacerations were repaired in the usual fashion using 4-O Vicryl.     Excellent hemostasis was noted. Needle count correct. Infant and patient in delivery room in good and stable condition.      Please see her Delivery Summary for full details regarding her delivery.    Her postpartum course was complicated by nothing. By the time of discharge, she was meeting all of her postpartum goals and deemed stable for discharge. She was voiding without difficulty, tolerating a regular diet without nausea and vomiting, her pain was well  "controlled on oral pain medicines and her lochia was appropriate. She was hemodynamically stable.     Physical exam on the day of discharge:  Vitals:    04/28/25 1653 04/28/25 2007 04/29/25 0026 04/29/25 0813   BP: 100/47 103/56 100/59 107/46   BP Location: Left arm Left arm Left arm Right arm   Patient Position: Right side Semi-Briones's Semi-Briones's Dangled   Cuff Size: Adult Regular Adult Regular Adult Regular    Pulse: 75 69 67 63   Resp: 16 16 16 18   Temp: 98.9  F (37.2  C) 98.6  F (37  C) 98.4  F (36.9  C) 97.9  F (36.6  C)   TempSrc: Oral Oral Oral Oral   SpO2:           General: sitting up, alert and cooperative  Abd: soft, non-distended, non-tender. Fundus firm, nontender, 2 cm below umbilicus.   Extremities: calves nontender, 1+ edema of lower extremities bilaterally    Lab Results   Component Value Date    HGB 11.1 04/27/2025    HGB 12.3 07/13/2023     Blood type: No results found for: \"RH\"    Discharge/Disposition:  Ms. Yolanda Damian was discharged to home in stable condition with the following instructions/medications:  1) Call for temperature > 100.4, foul smelling vaginal discharge, bleeding > 1 pad per hour x 2 hrs, pain not controlled by oral pain meds, thoughts of self-harm or harming the infant.  2) Contraception counseling was provided.  3) She was instructed to follow-up with her primary OB in 6 weeks for a routine postpartum visit, and in 1 week for a blood pressure check if having any blood pressure issues while hospitalized.  4) She was instructed to continue her PNV on discharge if she wished to breast feed her infant.  5) She was discharged home with the following medications:    Current Discharge Medication List        START taking these medications    Details   acetaminophen (TYLENOL) 325 MG tablet Take 2 tablets (650 mg) by mouth every 4 hours as needed for fever or other (second line or per patient preference for mild to moderate pain management).  Qty: 45 tablet, Refills: 1 " "   Associated Diagnoses: Gestational diabetes mellitus, delivered, current hospitalization; Vaginal delivery      ibuprofen (ADVIL/MOTRIN) 800 MG tablet Take 1 tablet (800 mg) by mouth every 6 hours as needed for other (first line or per patient preference for mild to moderate pain management).  Qty: 45 tablet, Refills: 1    Associated Diagnoses: Gestational diabetes mellitus, delivered, current hospitalization; Vaginal delivery           CONTINUE these medications which have NOT CHANGED    Details   insulin  UNIT/ML vial Inject 12 Units subcutaneously at bedtime.  Qty: 10 mL, Refills: 2    Comments: Dose update, fill when pt calls for refill.  Associated Diagnoses: Insulin controlled gestational diabetes mellitus (GDM) during pregnancy, antepartum      Prenatal Vit-Fe Fumarate-FA (PNV PRENATAL PLUS MULTIVITAMIN) 27-1 MG TABS per tablet Take 1 tablet by mouth daily.      acetone urine (KETOSTIX) test strip Use to test urine ketones every morning.  Qty: 50 strip, Refills: 1    Associated Diagnoses: Insulin controlled gestational diabetes mellitus (GDM) during pregnancy, antepartum      blood glucose (CONTOUR TEST) test strip 100 strips by In Vitro route 4 times daily. Use to test blood sugar 4 times daily or as directed.      blood glucose monitoring (NO BRAND SPECIFIED) meter device kit by In Vitro route 4 times daily. Use to test blood sugar 4 times daily or as directed.      insulin syringe-needle U-100 30G X 5/16\" 0.3 ML Use 1 syringes daily or as directed.  Qty: 100 each, Refills: 1    Associated Diagnoses: Insulin controlled gestational diabetes mellitus (GDM) during pregnancy, antepartum              Anali Ayon MD, MPH  [facility]   "

## 2025-04-29 NOTE — PLAN OF CARE
Vital signs stable. Postpartum checks WDL. Pt is up ad mariaelena, voiding w/o difficulties. Pt is independent in self cares. Pt is Breastfeeding every 2-3 hrs, tolerating well. Pain well controlled with Ibuprofen, denying Tylenol. Pt stated increased comfort after each medication. Pt is attentive to all  cues and cares. Positive bonding and frequent holding observed. FOB at bedside, supportive of pt.  used for all cares.      Fasting BG - 88.                 Goal Outcome Evaluation:      Plan of Care Reviewed With: patient, spouse    Overall Patient Progress: improvingOverall Patient Progress: improving      Problem: Adult Inpatient Plan of Care  Goal: Plan of Care Review  Description: The Plan of Care Review/Shift note should be completed every shift.  The Outcome Evaluation is a brief statement about your assessment that the patient is improving, declining, or no change.  This information will be displayed automatically on your shiftnote.  Outcome: Progressing  Flowsheets (Taken 2025)  Plan of Care Reviewed With:   patient   spouse  Overall Patient Progress: improving  Goal: Absence of Hospital-Acquired Illness or Injury  Intervention: Prevent Skin Injury  Recent Flowsheet Documentation  Taken 2025 by Alicia Mata, RN  Body Position: position changed independently  Intervention: Prevent Infection  Recent Flowsheet Documentation  Taken 2025 by Alicia Mata, RN  Infection Prevention:   hand hygiene promoted   single patient room provided   rest/sleep promoted  Goal: Optimal Comfort and Wellbeing  Intervention: Provide Person-Centered Care  Recent Flowsheet Documentation  Taken 2025 2007 by Alicia Mata, RN  Trust Relationship/Rapport:   care explained   choices provided   emotional support provided   questions answered   empathic listening provided   questions encouraged   thoughts/feelings acknowledged   reassurance provided     Problem: Labor  Goal: Stable  Fetal Wellbeing  Intervention: Promote and Monitor Fetal Wellbeing  Recent Flowsheet Documentation  Taken 4/28/2025 2007 by Alicia Mata RN  Body Position: position changed independently  Goal: Absence of Infection Signs and Symptoms  Intervention: Prevent or Manage Infection  Recent Flowsheet Documentation  Taken 4/28/2025 2007 by Alicia Mata RN  Infection Prevention:   hand hygiene promoted   single patient room provided   rest/sleep promoted     Problem: Postpartum (Vaginal Delivery)  Goal: Successful Parent Role Transition  Intervention: Support Parent Role Transition  Recent Flowsheet Documentation  Taken 4/28/2025 2007 by Alicia Mata RN  Supportive Measures:   active listening utilized   counseling provided   goal-setting facilitated   positive reinforcement provided   problem-solving facilitated   relaxation techniques promoted   self-care encouraged   verbalization of feelings encouraged  Goal: Optimal Pain Control and Function  Intervention: Prevent or Manage Pain  Recent Flowsheet Documentation  Taken 4/28/2025 2007 by Alicia Mata RN  Perineal Care:   perineal hygiene encouraged   perineal spray bottle/warm water use encouraged  Goal: Effective Urinary Elimination  Intervention: Monitor and Manage Urinary Retention  Recent Flowsheet Documentation  Taken 4/28/2025 2007 by Alicia Mata RN  Urinary Elimination Promotion: frequent voiding encouraged

## 2025-04-30 ENCOUNTER — PATIENT OUTREACH (OUTPATIENT)
Dept: CARE COORDINATION | Facility: CLINIC | Age: 45
End: 2025-04-30
Payer: COMMERCIAL

## 2025-05-07 ENCOUNTER — TELEPHONE (OUTPATIENT)
Dept: OBGYN | Facility: CLINIC | Age: 45
End: 2025-05-07
Payer: COMMERCIAL

## 2025-05-07 NOTE — TELEPHONE ENCOUNTER
Through [place]  Services [phone] I tried to call Yolanda to help set up her post partum appointment.  NA / no voice mail.   Rebecca Kraus CMA

## 2025-05-12 NOTE — TELEPHONE ENCOUNTER
Through [place]  [facility] [phone] I called patient with no answer/ no vm  Then called spouses phone number and left message on voice mail to call back to schedule post partum appointment  Rebecca Kraus CMA

## 2025-05-27 ENCOUNTER — TELEPHONE (OUTPATIENT)
Dept: OBGYN | Facility: CLINIC | Age: 45
End: 2025-05-27
Payer: COMMERCIAL

## 2025-05-27 NOTE — TELEPHONE ENCOUNTER
[facility] Call [facility]    Phone Message    May a detailed message be left on voicemail: yes     Reason for Call: Other: Post-partum appointment     Patient needs post partum appointment scheduled near the 6 week timeframe. Providers in her location were all out to July which puts her at week 10.    Action Taken: Message routed to:  Other: [place] [facility]    Travel Screening: Not Applicable     Date of Service:

## 2025-05-27 NOTE — TELEPHONE ENCOUNTER
Scheduled patient in [place] for PP visit. Patient is aware appt in [place].  Augustus Newman, ANOOP

## 2025-06-11 ENCOUNTER — PRENATAL OFFICE VISIT (OUTPATIENT)
Dept: OBGYN | Facility: CLINIC | Age: 45
End: 2025-06-11
Payer: COMMERCIAL

## 2025-06-11 VITALS — DIASTOLIC BLOOD PRESSURE: 72 MMHG | WEIGHT: 156.1 LBS | SYSTOLIC BLOOD PRESSURE: 120 MMHG | BODY MASS INDEX: 28.55 KG/M2

## 2025-06-11 DIAGNOSIS — Z30.41 ORAL CONTRACEPTIVE PILL SURVEILLANCE: ICD-10-CM

## 2025-06-11 DIAGNOSIS — Z86.32 HISTORY OF GESTATIONAL DIABETES: ICD-10-CM

## 2025-06-11 PROBLEM — Z78.9 NOT IMMUNE TO HEPATITIS B VIRUS: Status: RESOLVED | Noted: 2023-07-11 | Resolved: 2025-06-11

## 2025-06-11 PROCEDURE — 3074F SYST BP LT 130 MM HG: CPT | Performed by: OBSTETRICS & GYNECOLOGY

## 2025-06-11 PROCEDURE — 3078F DIAST BP <80 MM HG: CPT | Performed by: OBSTETRICS & GYNECOLOGY

## 2025-06-11 PROCEDURE — 0503F POSTPARTUM CARE VISIT: CPT | Performed by: OBSTETRICS & GYNECOLOGY

## 2025-06-11 PROCEDURE — 99207 PR POST PARTUM EXAM: CPT | Performed by: OBSTETRICS & GYNECOLOGY

## 2025-06-11 RX ORDER — ACETAMINOPHEN AND CODEINE PHOSPHATE 120; 12 MG/5ML; MG/5ML
0.35 SOLUTION ORAL DAILY
Qty: 84 TABLET | Refills: 3 | Status: SHIPPED | OUTPATIENT
Start: 2025-06-11

## 2025-06-11 NOTE — NURSING NOTE
"Chief Complaint   Patient presents with    Postpartum Care     24  vaginal Boy  6# 11.2 ozs   Pap 23 NIL   HPV= Neg         Initial /72 (BP Location: Right arm, Cuff Size: Adult Regular)   Wt 70.8 kg (156 lb 1.6 oz)   LMP 2024   Breastfeeding Yes   BMI 28.55 kg/m   Estimated body mass index is 28.55 kg/m  as calculated from the following:    Height as of 23: 1.575 m (5' 2\").    Weight as of this encounter: 70.8 kg (156 lb 1.6 oz).  BP completed using cuff size: regular    Questioned patient about current smoking habits.  Pt. has never smoked.          The following HM Due: NONE        Cyn Valentin, ANOOP on 2025 at 1:32 PM   "

## 2025-06-11 NOTE — PROGRESS NOTES
SUBJECTIVE: Yolanda is here for a 6-week postpartum checkup.    I communicated with Pt via  because Pt speaks no/limited English.    Delivery date was 25. She had a  of a viable boy, weight 6 pounds 11.2 oz., with none complications.  Since delivery, she has been breast feeding.  She has NA signs of infection, bleeding or other complications.  She is not pregnant.  We discussed contraceptions and she has chosen mini pill / progesterone only pill.  She  has not had intercourse since delivery and complains of No discomfort. Patient screened for postpartum depression and complaints are NEGATIVE. Screening has also been completed for intimate partner violence. Last Pap/HPV done outside of  2023. She had GDM A2.    EXAM:  Today's Depression Rating was   [place] Depression Scale  Thoughts of Harming Self: Never  Total Score: 0        No data to display              /72 (BP Location: Right arm, Cuff Size: Adult Regular)   Wt 70.8 kg (156 lb 1.6 oz)   LMP 2024   Breastfeeding Yes   BMI 28.55 kg/m    Abdomen- Abdomen soft, non-tender. BS normal. No masses, organomegaly  Pelvic- Exam chaperoned by nurse, External genitalia normal, Bartholin's glands normal, [place]'s glands normal, Urethral meatus normal, Urethra normal, Bladder normal, Vagina with normal rugae, no abnormal lesions, no abnormal discharge, Normal cervix without lesions or mucopus, no cervical motion tenderness, Uterus normal size, shape, and contour, no masses, non-tender, Adnexa normal size without masses or tenderness bilaterally, Anus normal      ASSESSMENT:   Encounter Diagnoses   Name Primary?    Postpartum care following vaginal delivery Yes    History of gestational diabetes     Oral contraceptive pill surveillance          PLAN:  1) Next Pap/HPV due 2026.  2) Rx Micronor  3) 2hr GTT  4) Return as needed or at time of next expected pap, pelvic, or breast exam.    Benny Joseph MD

## 2025-06-24 ENCOUNTER — MEDICAL CORRESPONDENCE (OUTPATIENT)
Dept: HEALTH INFORMATION MANAGEMENT | Facility: CLINIC | Age: 45
End: 2025-06-24
Payer: COMMERCIAL

## 2025-08-27 ENCOUNTER — MEDICAL CORRESPONDENCE (OUTPATIENT)
Dept: HEALTH INFORMATION MANAGEMENT | Facility: CLINIC | Age: 45
End: 2025-08-27
Payer: COMMERCIAL